# Patient Record
Sex: FEMALE | Race: BLACK OR AFRICAN AMERICAN | Employment: FULL TIME | URBAN - METROPOLITAN AREA
[De-identification: names, ages, dates, MRNs, and addresses within clinical notes are randomized per-mention and may not be internally consistent; named-entity substitution may affect disease eponyms.]

---
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
## 2021-07-10 ENCOUNTER — HOSPITAL ENCOUNTER (EMERGENCY)
Age: 21
Discharge: HOME | End: 2021-07-10
Attending: EMERGENCY MEDICINE | Admitting: EMERGENCY MEDICINE

## 2021-07-10 VITALS
DIASTOLIC BLOOD PRESSURE: 80 MMHG | WEIGHT: 140 LBS | HEART RATE: 68 BPM | TEMPERATURE: 98.4 F | RESPIRATION RATE: 16 BRPM | OXYGEN SATURATION: 100 % | SYSTOLIC BLOOD PRESSURE: 121 MMHG | BODY MASS INDEX: 24.8 KG/M2 | HEIGHT: 63 IN

## 2021-07-10 DIAGNOSIS — R10.9 ABDOMINAL PAIN, UNSPECIFIED ABDOMINAL LOCATION: Primary | ICD-10-CM

## 2021-07-10 LAB
AMORPH CRY URNS QL MICRO: ABNORMAL
APPEARANCE UR: CLEAR
BACTERIA URNS QL MICRO: ABNORMAL /HPF
BILIRUB UR QL: NEGATIVE
COLOR UR: YELLOW
EPITH CASTS URNS QL MICRO: ABNORMAL /LPF
GLUCOSE UR STRIP.AUTO-MCNC: NEGATIVE MG/DL
HCG UR QL: NEGATIVE
HGB UR QL STRIP: NEGATIVE
KETONES UR QL STRIP.AUTO: 50 MG/DL
LEUKOCYTE ESTERASE UR QL STRIP.AUTO: NEGATIVE
NITRITE UR QL STRIP.AUTO: NEGATIVE
PH UR STRIP: 7 [PH] (ref 5–8)
PROT UR STRIP-MCNC: NEGATIVE MG/DL
RBC #/AREA URNS HPF: ABNORMAL /HPF (ref 0–5)
SP GR UR REFRACTOMETRY: 1.01 (ref 1–1.03)
UA: UC IF INDICATED,UAUC: ABNORMAL
UROBILINOGEN UR QL STRIP.AUTO: 0.1 EU/DL (ref 0.2–1)
WBC URNS QL MICRO: ABNORMAL /HPF (ref 0–4)

## 2021-07-10 PROCEDURE — 81025 URINE PREGNANCY TEST: CPT

## 2021-07-10 PROCEDURE — 81001 URINALYSIS AUTO W/SCOPE: CPT

## 2021-07-10 PROCEDURE — 99283 EMERGENCY DEPT VISIT LOW MDM: CPT

## 2021-07-10 NOTE — ED TRIAGE NOTES
Pt c/o 5 days of lower abd pain with some vaginal spotting. Denies dysuria. Missed last menstrual cycle. Home preg test was neg.   Has implant for birth control in arm

## 2021-07-10 NOTE — ED PROVIDER NOTES
EMERGENCY DEPARTMENT HISTORY AND PHYSICAL EXAM      Date: 7/10/2021  Patient Name: Dario Zapata    History of Presenting Illness     Chief Complaint   Patient presents with    Abdominal Pain       History Provided By: Patient    HPI: Dario Zapata, 21 y.o. female with a past medical history significant No significant past medical history presents to the ED with cc of lower abdominal crampy pain and bilateral lower back pain intermittently for the past 5 days. She reports 2 days of spotting last week. Patient states that she has the birth control implant causes her periods to be irregular. However, usually when she has her menstrual period, it lasts anywhere between 1 and 3 weeks. Patient denies vaginal discharge. She denies dysuria or other urinary symptoms. The patient states that she took a home pregnancy test at home and it was negative. The patient reports a monogamous sexual relationship with her boyfriend of 3 years. The patient denies fevers or chills. No nausea or vomiting. There are no other complaints, changes, or physical findings at this time. PCP: None    No current facility-administered medications on file prior to encounter. No current outpatient medications on file prior to encounter. Past History     Past Medical History:  History reviewed. No pertinent past medical history. Past Surgical History:  History reviewed. No pertinent surgical history. Family History:  History reviewed. No pertinent family history. Social History:  Social History     Tobacco Use    Smoking status: Never Smoker    Smokeless tobacco: Never Used   Substance Use Topics    Alcohol use: Never    Drug use: Yes     Types: Marijuana       Allergies:  No Known Allergies      Review of Systems     Review of Systems   Constitutional: Negative for chills and fever. HENT: Negative for congestion and sore throat. Eyes: Negative for pain and redness.    Respiratory: Negative for cough and shortness of breath. Cardiovascular: Negative for chest pain and leg swelling. Gastrointestinal: Positive for abdominal pain. Negative for diarrhea, nausea and vomiting. Genitourinary: Negative for dysuria, frequency, hematuria and urgency. Musculoskeletal: Negative for arthralgias and myalgias. Skin: Negative for pallor and rash. Neurological: Negative for dizziness, numbness and headaches. Psychiatric/Behavioral: Negative for agitation and dysphoric mood. Physical Exam     Physical Exam  Vitals and nursing note reviewed. Constitutional:       General: She is not in acute distress. Appearance: Normal appearance. She is well-developed and normal weight. She is not ill-appearing or toxic-appearing. HENT:      Head: Normocephalic and atraumatic. Mouth/Throat:      Mouth: Mucous membranes are moist.      Pharynx: Oropharynx is clear. No pharyngeal swelling. Eyes:      General: No scleral icterus. Extraocular Movements: Extraocular movements intact. Conjunctiva/sclera: Conjunctivae normal.      Pupils: Pupils are equal, round, and reactive to light. Cardiovascular:      Rate and Rhythm: Normal rate and regular rhythm. Heart sounds: Normal heart sounds. No murmur heard. No friction rub. No gallop. Pulmonary:      Effort: Pulmonary effort is normal. No respiratory distress. Breath sounds: Normal breath sounds. No wheezing, rhonchi or rales. Abdominal:      General: Abdomen is flat. Bowel sounds are normal. There is no distension. Palpations: Abdomen is soft. There is no hepatomegaly, splenomegaly, mass or pulsatile mass. Tenderness: There is no abdominal tenderness. There is no right CVA tenderness, left CVA tenderness, guarding or rebound. Musculoskeletal:         General: No swelling or tenderness. Normal range of motion. Cervical back: Neck supple. Skin:     General: Skin is warm and dry.       Capillary Refill: Capillary refill takes less than 2 seconds. Findings: No erythema or rash. Neurological:      General: No focal deficit present. Mental Status: She is alert and oriented to person, place, and time. Cranial Nerves: No cranial nerve deficit. Motor: No weakness. Psychiatric:         Mood and Affect: Mood normal.         Behavior: Behavior normal.         Diagnostic Study Results     Labs -     Recent Results (from the past 12 hour(s))   URINALYSIS W/ REFLEX CULTURE    Collection Time: 07/10/21 12:11 PM    Specimen: Urine   Result Value Ref Range    Color Yellow      Appearance Clear Clear      Specific gravity 1.015 1.003 - 1.030      pH (UA) 7.0 5.0 - 8.0      Protein Negative Negative mg/dL    Glucose Negative Negative mg/dL    Ketone 50 (A) Negative mg/dL    Bilirubin Negative Negative      Blood Negative Negative      Urobilinogen 0.1 (L) 0.2 - 1.0 EU/dL    Nitrites Negative Negative      Leukocyte Esterase Negative Negative      WBC 0-4 0 - 4 /hpf    RBC 5-10 0 - 5 /hpf    Epithelial cells Moderate (A) Few /lpf    Bacteria 1+ (A) Negative /hpf    UA:UC IF INDICATED Culture not indicated by UA result Culture not indicated by UA result      Amorphous Crystals 2+ (A) Negative   HCG URINE, QL    Collection Time: 07/10/21 12:12 PM   Result Value Ref Range    HCG urine, QL Negative Negative         Radiologic Studies -   @lastxrresult@  CT Results  (Last 48 hours)    None        CXR Results  (Last 48 hours)    None            Medical Decision Making   I am the first provider for this patient. I reviewed the vital signs, available nursing notes, past medical history, past surgical history, family history and social history. Vital Signs-Reviewed the patient's vital signs.   Patient Vitals for the past 12 hrs:   Temp Pulse Resp BP SpO2   07/10/21 1301 -- -- -- -- 100 %   07/10/21 1200 98.4 °F (36.9 °C) 68 16 121/80 100 %       Records Reviewed: Nursing Notes and Old Medical Records        Provider Notes (Medical Decision Making): The patient had a urinalysis which was not consistent with a urinary tract infection. Pregnancy test was negative. The patient reports having a monogamous sexual relationship with her boyfriend for the past 3 years. She denies vaginal discharge. No dysuria or other urinary symptoms. The patient had a nontender abdomen on exam.  I think most likely the patient is going to start her menstrual cycle soon which is causing the pain. The patient has an appointment with her OB/GYN in a couple of weeks. The patient is given follow-up and return to ED instructions, voices understanding, and is agreeable to the plan. [facility]         ED Course:   Initial assessment performed. The patients presenting problems have been discussed, and they are in agreement with the care plan formulated and outlined with them. I have encouraged them to ask questions as they arise throughout their visit. PROCEDURES  Procedures         PLAN:  1. There are no discharge medications for this patient. 2.   Follow-up Information     Follow up With Specialties Details Why Contact Info    your OB/Gyn   as scheduled         Return to ED if worse     Diagnosis     Clinical Impression:   1.  Abdominal pain, unspecified abdominal location

## 2022-09-22 ENCOUNTER — HOSPITAL ENCOUNTER (EMERGENCY)
Age: 22
Discharge: HOME | End: 2022-09-22
Payer: COMMERCIAL

## 2022-09-22 VITALS
OXYGEN SATURATION: 100 % | WEIGHT: 140 LBS | BODY MASS INDEX: 25.76 KG/M2 | DIASTOLIC BLOOD PRESSURE: 80 MMHG | TEMPERATURE: 98.7 F | SYSTOLIC BLOOD PRESSURE: 150 MMHG | RESPIRATION RATE: 20 BRPM | HEART RATE: 95 BPM | HEIGHT: 62 IN

## 2022-09-22 DIAGNOSIS — S91.011A LACERATION OF RIGHT ANKLE, INITIAL ENCOUNTER: Primary | ICD-10-CM

## 2022-09-22 PROCEDURE — 99283 EMERGENCY DEPT VISIT LOW MDM: CPT

## 2022-09-22 PROCEDURE — 75810000293 HC SIMP/SUPERF WND  RPR

## 2022-09-22 RX ORDER — BACITRACIN 500 [USP'U]/G
OINTMENT TOPICAL 3 TIMES DAILY
Qty: 1 EACH | Refills: 0 | Status: SHIPPED | OUTPATIENT
Start: 2022-09-22 | End: 2022-10-02

## 2022-09-22 RX ORDER — LIDOCAINE HYDROCHLORIDE 20 MG/ML
0.3 INJECTION, SOLUTION INFILTRATION; PERINEURAL ONCE
Status: DISCONTINUED | OUTPATIENT
Start: 2022-09-22 | End: 2022-09-22 | Stop reason: HOSPADM

## 2022-09-22 NOTE — ED PROVIDER NOTES
EMERGENCY DEPARTMENT HISTORY AND PHYSICAL EXAM      Date: 9/22/2022  Patient Name: Josephine Ac    History of Presenting Illness     Chief Complaint   Patient presents with    Laceration       History Provided By: Patient    HPI: Josephine Ac, 24 y.o. female [facility]'s of laceration to right heel. She reports working at [employer] when she was taking out the trash which hit the back of her heel thinking a glass bottle cut her. She reports bleeding controlled with use of pressure dressing. Up-to-date on tetanus shot. There are no other complaints, changes, or physical findings at this time. PCP: None    No current facility-administered medications on file prior to encounter. No current outpatient medications on file prior to encounter. Past History     Past Medical History:  History reviewed. No pertinent past medical history. Past Surgical History:  History reviewed. No pertinent surgical history. Family History:  History reviewed. No pertinent family history. Social History:  Social History     Tobacco Use    Smoking status: Never    Smokeless tobacco: Never   Substance Use Topics    Alcohol use: Never    Drug use: Not Currently     Types: Marijuana       Allergies:  No Known Allergies    Review of Systems   Review of Systems   Constitutional:  Negative for chills and fever. Musculoskeletal:  Negative for arthralgias and myalgias. Skin:  Positive for wound. Psychiatric/Behavioral: Negative. All other systems reviewed and are negative. Physical Exam   Physical Exam  Vitals and nursing note reviewed. Constitutional:       General: She is not in acute distress. Appearance: Normal appearance. She is normal weight. She is not ill-appearing. HENT:      Head: Normocephalic and atraumatic. Nose: Nose normal.      Mouth/Throat:      Mouth: Mucous membranes are moist.   Eyes:      Extraocular Movements: Extraocular movements intact.       Conjunctiva/sclera: Conjunctivae normal.   Cardiovascular:      Pulses: Normal pulses. Dorsalis pedis pulses are 2+ on the right side and 2+ on the left side. Pulmonary:      Effort: Pulmonary effort is normal. No respiratory distress. Musculoskeletal:         General: Signs of injury present. Normal range of motion. Right foot: Normal range of motion. Left foot: Normal range of motion. Skin:     General: Skin is warm and dry. Capillary Refill: Capillary refill takes less than 2 seconds. Findings: Signs of injury and laceration present. No abrasion, ecchymosis or rash. Comments: Right ankle about 2.5 cm   Neurological:      Mental Status: She is alert. Lab and Diagnostic Study Results   Labs -   No results found for this or any previous visit (from the past 12 hour(s)). Radiologic Studies -   @lastxrresult@  CT Results  (Last 48 hours)      None          CXR Results  (Last 48 hours)      None            Medical Decision Making and ED Course   Differential Diagnosis & Medical Decision Making Provider Note: Laceration, abrasion    Patient afebrile not tachycardic SPO2 100% on room air. Hemodynamically stable. Site approximated with 4 sutures to right heel tolerated well advised signs symptoms of infection follow-up in 7 to 10 days for suture removal.  Patient verbalized understanding stable at time of discharge. - I am the first provider for this patient. I reviewed the vital signs, available nursing notes, past medical history, past surgical history, family history and social history. The patients presenting problems have been discussed, and they are in agreement with the care plan formulated and outlined with them. I have encouraged them to ask questions as they arise throughout their visit. Vital Signs-Reviewed the patient's vital signs.   Patient Vitals for the past 12 hrs:   Temp Pulse Resp BP SpO2   09/22/22 1333 98.7 °F (37.1 °C) 95 20 (!) 150/80 100 %       ED Course: Procedures   Performed by: Renea Cummins NP  Wound Repair    Date/Time: 9/22/2022 2:39 PM  Performed by: NPPreparation: skin prepped with Shur-Clens  Pre-procedure re-eval: Immediately prior to the procedure, the patient was reevaluated and found suitable for the planned procedure and any planned medications. Time out: Immediately prior to the procedure a time out was called to verify the correct patient, procedure, equipment, staff and marking as appropriate. .  Location details: right ankle  Wound length:2.5 cm or less    Anesthesia:  Local Anesthetic: lidocaine 2% without epinephrine  Foreign bodies: no foreign bodies  Irrigation solution: saline  Debridement: none  Wound skin closure material used: 3.0. Number of sutures: 4  Technique: simple and interrupted  Approximation: close  Patient tolerance: patient tolerated the procedure well with no immediate complications  My total time at bedside, performing this procedure was 1-15 minutes. Disposition   Disposition: DC- Adult Discharges: All of the diagnostic tests were reviewed and questions answered. Diagnosis, care plan and treatment options were discussed. The patient understands the instructions and will follow up as directed. The patients results have been reviewed with them. They have been counseled regarding their diagnosis. The patient verbally convey understanding and agreement of the signs, symptoms, diagnosis, treatment and prognosis and additionally agrees to follow up as recommended with their PCP in 24 - 48 hours. They also agree with the care-plan and convey that all of their questions have been answered. I have also put together some discharge instructions for them that include: 1) educational information regarding their diagnosis, 2) how to care for their diagnosis at home, as well a 3) list of reasons why they would want to return to the ED prior to their follow-up appointment, should their condition change.     DISCHARGE PLAN:  1. Current Discharge Medication List        START taking these medications    Details   bacitracin (BACITRACIN) 500 unit/gram oint Apply  to affected area three (3) times daily for 10 days. Apply to affected area  Qty: 1 Each, Refills: 0           2. Follow-up Information       Follow up With Specialties Details Why Contact Info    Lora Minor NP Nurse Practitioner Schedule an appointment as soon as possible for a visit in 1 week establish care with pcp, For suture removal 7-10 days [facility]  [phone]            3. Return to ED if worse   4. Current Discharge Medication List        START taking these medications    Details   bacitracin (BACITRACIN) 500 unit/gram oint Apply  to affected area three (3) times daily for 10 days. Apply to affected area  Qty: 1 Each, Refills: 0  Start date: 9/22/2022, End date: 10/2/2022             Diagnosis/Clinical Impression     Clinical Impression:   1. Laceration of right ankle, initial encounter        Attestations: Renea WALLS NP, am the primary clinician of record. Please note that this dictation was completed with [employer], the [employer] voice recognition software. Quite often unanticipated grammatical, syntax, homophones, and other interpretive errors are inadvertently transcribed by the computer software. Please disregard these errors. Please excuse any errors that have escaped final proofreading. Thank you.

## 2022-09-22 NOTE — Clinical Note
[address]  [address] [place] [id number]  [phone]    Work/School Note    Date: 9/22/2022    To Whom It May concern:      Stephon Booker was seen and treated today in the emergency room by the following provider(s):  Nurse Practitioner: Etta Ledesma NP. Stephon Booker is excused from work/school on 09/22/22. She is clear to return to work/school on 09/23/22.         Sincerely,          Janina Franz NP

## 2022-09-22 NOTE — ED TRIAGE NOTES
Pt was taking out the trash at her job and something hit the back of her leg and she didn't think much of it until she noticed the blood from the lac. Possible beer bottle in the trash. Tetanus is up to date.

## 2022-09-23 ENCOUNTER — PATIENT OUTREACH (OUTPATIENT)
Dept: OTHER | Age: 22
End: 2022-09-23

## 2022-09-23 NOTE — PROGRESS NOTES
Initial HPRP:   Patient on report as discharged from [facility] ED Visit 9/22/22 for Laceration of Right Ankle. Initial attempt to contact patient for transitions of care. Left discreet message on voicemail with this Care Coordinator's contact information. Next Outreach 9/26/22. f/u - ? PCP      bacitracin 500 unit/gram Oint    Call your doctor now or seek immediate medical care if:  You have new pain, or your pain gets worse. The skin near the cut is cold or pale or changes color. You have tingling, weakness, or numbness near the cut. The cut starts to bleed, and blood soaks through the bandage. Oozing small amounts of  blood is normal.  You have trouble moving the area near the cut. You have symptoms of infection, such as: Increased pain, swelling, warmth, or redness around the cut. Red streaks leading from the cut. Pus draining from the cut. A fever. The cut reopens.

## 2022-09-26 ENCOUNTER — PATIENT OUTREACH (OUTPATIENT)
Dept: OTHER | Age: 22
End: 2022-09-26

## 2022-09-26 NOTE — LETTER
9/26/2022 4:45 PM    Ms. Oni Ramirez 89    Dear Javi Alexis      My name is Michelle Ca, Associate Care Coordinator for [address] and I have been trying to reach you. The Associate Care Management (ACM) program is a free-of-charge confidential service provided to our associates and their family members covered by the [facility]. The program will provide an associate and his/her family with the [facility] expertise to assist in navigating the health care delivery system, provider services, and their overall care needs--so as to assure and improve health care interactions and enhance the quality of life. This program is designed to provide you with the opportunity to have a [employer] partner with you for the following services:     1) when you come home from the hospital or emergency room   2) when help is needed to manage your disease   3) when you need assistance coordinating services or appointments  4) when you need additional education, resources or assistance reaching your Be Well Health Program goals/requirements such as Be Well With Diabetes    [facility] is dedicated to empowering the good health of its community and improving the quality of care and care experiences for associates and their families. We are committed to safeguarding patient confidentiality and privacy, assuring that every associate has the respect he or she deserves in managing their health. The information shared with your care manager will not be shared with anyone else aside from those you identify as part of your care team, and will only be used to assist you with any identified care needs. Please contact me if you would like this service provided to you.      Sincerely,    Joan Giles, [address] Coordinator  Associate Care Management  [facility]  [address] [address] [phone]  F [phone]  [email or web address]

## 2022-09-26 NOTE — PROGRESS NOTES
Patient identified as eligible for [address] [place] [address] services. Second telephone outreach attempted. Left discreet voicemail with this CM confidential contact information. Will send UTR letter via Mail. Next Outreach 10/12/22 f/u - Suture Removal, ? PCP

## 2022-10-12 ENCOUNTER — PATIENT OUTREACH (OUTPATIENT)
Dept: OTHER | Age: 22
End: 2022-10-12

## 2022-10-12 NOTE — PROGRESS NOTES
HPRP f/u:  Telephone attempt to contact patient for Health Promotion and Risk Prevention. Left discreet message on voicemail with this CC contact information. Will follow for one month for transitions of care needs. Next outreach is 10/24/22 for discussion f/u - Suture Removal, ? PCP and Resolve Episode.

## 2022-10-16 ENCOUNTER — HOSPITAL ENCOUNTER (EMERGENCY)
Age: 22
Discharge: HOME | End: 2022-10-16
Attending: FAMILY MEDICINE
Payer: COMMERCIAL

## 2022-10-16 VITALS
SYSTOLIC BLOOD PRESSURE: 120 MMHG | HEART RATE: 79 BPM | OXYGEN SATURATION: 99 % | BODY MASS INDEX: 24.8 KG/M2 | WEIGHT: 140 LBS | RESPIRATION RATE: 18 BRPM | TEMPERATURE: 99 F | DIASTOLIC BLOOD PRESSURE: 80 MMHG | HEIGHT: 63 IN

## 2022-10-16 DIAGNOSIS — Z51.89 VISIT FOR WOUND CHECK: Primary | ICD-10-CM

## 2022-10-16 PROCEDURE — 99282 EMERGENCY DEPT VISIT SF MDM: CPT

## 2022-10-16 NOTE — Clinical Note
[address]  [address] [facility] [address]  [phone]    Work/School Note    Date: 10/16/2022    To Whom It May concern:      Sara Hutsonuphin was seen and treated today in the emergency room by the following provider(s):  Attending Provider: Lane Regalado is excused from work/school on 10/16/22. She is clear to return to work/school on 10/17/22.         Sincerely,          Valentino Santos, DO

## 2022-10-16 NOTE — ED PROVIDER NOTES
EMERGENCY DEPARTMENT HISTORY AND PHYSICAL EXAM      Date: 10/16/2022  Patient Name: Bonita Cohen    History of Presenting Illness     Chief Complaint   Patient presents with    Skin Problem       History Provided By:     HPI: Bonita Cohen, is a very pleasant 24 y.o. female presenting to the ED with a chief complaint of skin problem. Patient states she recently had a laceration above her right heel. Stitches were placed. These have since been removed. Since this time when she walks she experiences a pulling sensation. She denies any redness nor drainage of pus. No numbness tingling or weakness in extremities. No other complaints at this time. The patient denies any other symptoms at this time. PCP: None    No current facility-administered medications on file prior to encounter. No current outpatient medications on file prior to encounter. Past History     Past Medical History:  History reviewed. No pertinent past medical history. Past Surgical History:  No past surgical history on file. Family History:  History reviewed. No pertinent family history. Social History:  Social History     Tobacco Use    Smoking status: Never    Smokeless tobacco: Never   Substance Use Topics    Alcohol use: Never    Drug use: Not Currently     Types: Marijuana       Allergies:  No Known Allergies      Review of Systems     Review of Systems   Constitutional:  Negative for activity change, appetite change, chills, fatigue and fever. HENT:  Negative for congestion and sore throat. Eyes:  Negative for photophobia and visual disturbance. Respiratory:  Negative for cough, shortness of breath and wheezing. Cardiovascular:  Negative for chest pain, palpitations and leg swelling. Gastrointestinal:  Negative for abdominal pain, diarrhea, nausea and vomiting. Endocrine: Negative for cold intolerance and heat intolerance. Musculoskeletal:  Negative for gait problem and joint swelling.    Skin: Negative for color change and rash. Neurological:  Negative for dizziness and headaches. Physical Exam     Physical Exam  Constitutional:       Appearance: She is well-developed. HENT:      Head: Normocephalic and atraumatic. Mouth/Throat:      Mouth: Mucous membranes are moist.      Pharynx: Oropharynx is clear. Eyes:      Conjunctiva/sclera: Conjunctivae normal.      Pupils: Pupils are equal, round, and reactive to light. Cardiovascular:      Rate and Rhythm: Normal rate and regular rhythm. Heart sounds: No murmur heard. Pulmonary:      Effort: No respiratory distress. Breath sounds: No stridor. No wheezing, rhonchi or rales. Abdominal:      General: There is no distension. Tenderness: There is no abdominal tenderness. There is no rebound. Musculoskeletal:      Cervical back: Normal range of motion and neck supple. Skin:     General: Skin is warm and dry. Comments: Healing 1 cm laceration above right heel. No cellulitis lymphangitis or fluctuance. Neurological:      General: No focal deficit present. Mental Status: She is alert and oriented to person, place, and time. Psychiatric:         Mood and Affect: Mood normal.         Behavior: Behavior normal.       Lab and Diagnostic Study Results     Labs -   No results found for this or any previous visit (from the past 12 hour(s)). Radiologic Studies -   @lastxrresult@  CT Results  (Last 48 hours)      None          CXR Results  (Last 48 hours)      None              Medical Decision Making   - I am the first provider for this patient. - I reviewed the vital signs, available nursing notes, past medical history, past surgical history, family history and social history. - Initial assessment performed. The patients presenting problems have been discussed, and they are in agreement with the care plan formulated and outlined with them.   I have encouraged them to ask questions as they arise throughout their visit.    Vital Signs-Reviewed the patient's vital signs. Patient Vitals for the past 12 hrs:   Temp Pulse Resp BP SpO2   10/16/22 0930 99 °F (37.2 °C) 79 18 120/80 99 %         ED Course/ Provider Notes (Medical Decision Making):     Patient presented to the emergency department with the aforementioned chief complaint. On examination the patient is nontoxic. Vitals were reviewed per above. Wound appears to be healing very well. No dehiscence. Patient neurovascularly intact. Will have patient follow-up with podiatry regarding continued pain    Manoj Santos was given a thorough list of signs and symptoms that would warrant an immediate return to the emergency department. Otherwise Manoj Santos will follow up with PCP. Procedures   Medical Decision Makingedical Decision Making  Performed by: Christine Ulloa,   Procedures  None       Disposition   Disposition:     Home     All of the diagnostic tests were reviewed and questions answered. Diagnosis, care plan and treatment options were discussed. The patient understands the instructions and will follow up as directed. The patients results have been reviewed with them. They have been counseled regarding their diagnosis. The patient verbally convey understanding and agreement of the signs, symptoms, diagnosis, treatment and prognosis and additionally agrees to follow up as recommended with their PCP in 24 - 48 hours. They also agree with the care-plan and convey that all of their questions have been answered. I have also put together some discharge instructions for them that include: 1) educational information regarding their diagnosis, 2) how to care for their diagnosis at home, as well a 3) list of reasons why they would want to return to the ED prior to their follow-up appointment, should their condition change. DISCHARGE PLAN:    1. There are no discharge medications for this patient.         2.   Follow-up Information       Follow up With Specialties Details Why Contact Info    Your primary care doctor  Schedule an appointment as soon as possible for a visit in 2 days      [facility] Call today  [address] [facility] [address]  [phone]                3.  Return to ED if worse       4. There are no discharge medications for this patient. Diagnosis     Clinical Impression:    1. Visit for wound check        Attestations:    Jhony Rodrigez, DO    Please note that this dictation was completed with [employer], the computer voice recognition software. Quite often unanticipated grammatical, syntax, homophones, and other interpretive errors are inadvertently transcribed by the computer software. Please disregard these errors. Please excuse any errors that have escaped final proofreading. Thank you.

## 2022-10-17 ENCOUNTER — PATIENT OUTREACH (OUTPATIENT)
Dept: OTHER | Age: 22
End: 2022-10-17

## 2022-10-17 NOTE — PROGRESS NOTES
[facility]PRP f/u:  Telephone attempt to contact patient for Health Promotion and Risk Prevention. Left discreet message on voicemail with this CC contact information. Chart Review; 10/16/22 [facility] ED - Visit for Wound Check    Salvador Rock, is a very pleasant 24 y.o. female presenting to the ED with a chief complaint of skin problem. Patient states she recently had a laceration above her right heel. Stitches were placed. These have since been removed. Since this time when she walks she experiences a pulling sensation. She denies any redness nor drainage of pus. No numbness tingling or weakness in extremities. Wound appears to be healing very well. No dehiscence. Patient neurovascularly intact. Will have patient follow-up with podiatry regarding continued pain    You have not been prescribed any medications.

## 2022-10-24 ENCOUNTER — PATIENT OUTREACH (OUTPATIENT)
Dept: OTHER | Age: 22
End: 2022-10-24

## 2022-10-24 NOTE — LETTER
10/24/2022 4:40 PM    Ms. Oni Ramirez 89    Dear Elizabeth Ervin    My name is Taj Mitchell,  Associate Care Coordinator for [address] [place] [address],[address], and I have been trying to reach you. The Associate Care Management (ACM) program is a free-of-charge, confidential service provided to our employees and their family members covered by the [facility]. I can help you with care transitions such as when you come home from the hospital, when help is needed to manage your disease, or when you need assistance coordinating services or appointments. As healthcare providers, we know that patients do better when they have close follow up with a primary care provider (PCP). I can help you find one that is convenient to you and covered by your insurance. I can also help you understand any after visit instructions, such as what symptoms to watch out for, or any new or changed medications. We can work together using your preferred communication -- telephone, email, MyChart. If you do not have a [employer] account, I can help you request access. Our program is designed to provide you with the opportunity to have a [facility] care manager partner with you for your healthcare needs. Due to not being able to reach you, I am closing out the current program, but will remain available to you should you have any questions. Please contact me at the below number if I can provide you with assistance for any of the above services.     Sincerely,    aKthleen Avery LPN  [facility] Care Coordinator  Associate Care Management  [address] [place] [address],[address]  [address]  [address]  [place] [phone]   [phone]  [email or web address]

## 2022-10-26 ENCOUNTER — OFFICE VISIT (OUTPATIENT)
Dept: PODIATRY | Age: 22
End: 2022-10-26
Payer: COMMERCIAL

## 2022-10-26 VITALS
BODY MASS INDEX: 24.98 KG/M2 | DIASTOLIC BLOOD PRESSURE: 72 MMHG | HEIGHT: 63 IN | SYSTOLIC BLOOD PRESSURE: 131 MMHG | WEIGHT: 141 LBS | HEART RATE: 71 BPM | TEMPERATURE: 97.1 F

## 2022-10-26 DIAGNOSIS — M25.471 PAIN AND SWELLING OF RIGHT ANKLE: Primary | ICD-10-CM

## 2022-10-26 DIAGNOSIS — M25.571 PAIN AND SWELLING OF RIGHT ANKLE: Primary | ICD-10-CM

## 2022-10-26 PROCEDURE — 99203 OFFICE O/P NEW LOW 30 MIN: CPT | Performed by: PODIATRIST

## 2022-10-26 PROCEDURE — 73610 X-RAY EXAM OF ANKLE: CPT | Performed by: PODIATRIST

## 2022-10-26 NOTE — PROGRESS NOTES
[facility] FOOT SURGERY     Patient Name: Floresita Velazquez    : 2000    Visit Date: 10/26/2022    Office Visit Note    Subjective:         Patient is a 24 y.o. female who is being in office with pain and swelling of the right heel. Patient states this all started when she had a laceration at her job with a thick glass bottle to the back of her ankle. Patient went to the emergency department 2022 in which the ER doctor performed a primary closure of the right posterior ankle. Patient then went to remove sutures 2 weeks later and patient states that she still complained about some pain to the posterior ankle and strength to her foot. Patient then went to ER again on 10/16/2022 still complaining of pain and she was referred to our office for further evaluation. Patient states that she has radiating pain when she ambulates. No past medical history on file. No past surgical history on file. No family history on file. Social History     Tobacco Use    Smoking status: Never    Smokeless tobacco: Never   Substance Use Topics    Alcohol use: Never     No Known Allergies  Prior to Admission medications    Not on File       Review of Systems   Constitutional:  Negative for chills, diaphoresis, fever and malaise/fatigue. Respiratory:  Negative for cough, hemoptysis, sputum production, shortness of breath and wheezing. No cyanosis   Cardiovascular:  Negative for chest pain, palpitations, claudication and leg swelling. Gastrointestinal:  Negative for abdominal pain, constipation, diarrhea, heartburn, nausea and vomiting. Genitourinary:  Negative for frequency. Musculoskeletal:  Positive for joint pain. Negative for back pain, myalgias and neck pain. Skin:  Negative for itching and rash. Neurological:  Negative for tingling and headaches. Alert and oriented x 4. Endo/Heme/Allergies:  Negative for polydipsia.    Psychiatric/Behavioral:  Negative for depression and memory loss. The patient is not nervous/anxious. Objective:     Visit Vitals  /72 (BP 1 Location: Right upper arm, BP Patient Position: Sitting, BP Cuff Size: Large adult)   Pulse 71   Temp 97.1 °F (36.2 °C) (Temporal)   Ht 5' 3\" (1.6 m)   Wt 141 lb (64 kg)   BMI 24.98 kg/m²       GEN: Pt is AAOx4 and in NAD. No dressing noted to B/L LE. No family noted at [facility]  DERM: Wound to the posterior aspect of the right ankle was noted to be healed with cicatrix noted. LE. No dry skin noted to B/L LE. No proximal lymphatic streaking noted to B/L LE. NCSOI noted to B/L LE  VASC: Pedal pulses (DP/PT) palpable to B/L LE. CFT<3sec to all digits of B/L LE. Pedal hair growth noted to the level of the digits for B/L LE. Skin temp is warm to warm from proximal to distal for B/L LE. Neg homans/gaby signs to B/L LE. No varicosities or telangectasias noted to B/L LE. Edema noted to the right ankle. NEURO: Protective and epicritic sensations grossly intact to B/L LE  MSK: (Pain along the Achilles tendon of the right lower extremity. Decreased muscle strength to the right ankle joint. PSYCH: Cooperative with normal mood and affect                 X-rays of the right ankle 3 views. No acute fractures noted. Joint spacing is adequate. No breaks in cortices or radiolucencies. No foreign body noted to the soft tissue. Data Review: No results found for this or any previous visit (from the past 24 hour(s)). Assessment:     Diagnoses and all orders for this visit:    1. Pain and swelling of right ankle  -     MRI ANKLE RT WO CONT; Future       Plan:     -Patient seen and evaluated in the office. -X-rays of the right ankle taken in office to rule out any foreign body. No foreign body seen in x-ray. -Prescribed MRI to the right ankle of the patient to evaluate Achilles tendon laceration.  -Discussed with patient to wear compression sock to reduce swelling of the right ankle.   -Patient to follow-up in office after MRI results.            Rebel Spencer, DERICK, CWSP, [facility] and [address], [address]: [phone]  F: [phone]

## 2022-10-28 ENCOUNTER — HOSPITAL ENCOUNTER (OUTPATIENT)
Dept: MRI IMAGING | Age: 22
Discharge: HOME | End: 2022-10-28
Attending: PODIATRIST
Payer: COMMERCIAL

## 2022-10-28 DIAGNOSIS — M25.471 PAIN AND SWELLING OF RIGHT ANKLE: ICD-10-CM

## 2022-10-28 DIAGNOSIS — M25.571 PAIN AND SWELLING OF RIGHT ANKLE: ICD-10-CM

## 2022-10-28 PROCEDURE — 73721 MRI JNT OF LWR EXTRE W/O DYE: CPT

## 2022-10-31 ENCOUNTER — TELEPHONE (OUTPATIENT)
Dept: PODIATRY | Age: 22
End: 2022-10-31

## 2022-10-31 NOTE — TELEPHONE ENCOUNTER
PT CAME IN TO SEE IF DR ROQUE WOULD BE ABLE TO READ HER THE MRI RESULTS, NOT NOTATED YET.  PLEASE ADVISE

## 2022-11-01 ENCOUNTER — OFFICE VISIT (OUTPATIENT)
Dept: PODIATRY | Age: 22
End: 2022-11-01
Payer: COMMERCIAL

## 2022-11-01 VITALS
BODY MASS INDEX: 24.8 KG/M2 | HEART RATE: 60 BPM | SYSTOLIC BLOOD PRESSURE: 158 MMHG | WEIGHT: 140 LBS | OXYGEN SATURATION: 100 % | TEMPERATURE: 96.9 F | HEIGHT: 63 IN | DIASTOLIC BLOOD PRESSURE: 76 MMHG

## 2022-11-01 DIAGNOSIS — S86.011D PARTIAL TEAR OF RIGHT ACHILLES TENDON, SUBSEQUENT ENCOUNTER: Primary | ICD-10-CM

## 2022-11-01 PROCEDURE — 99213 OFFICE O/P EST LOW 20 MIN: CPT | Performed by: PODIATRIST

## 2022-11-01 NOTE — LETTER
NOTIFICATION RETURN TO WORK / SCHOOL    11/1/2022 11:11 AM    Ms. Houston Brenner  [facility] [address] [address]      To Whom It May Concern:    Houston Brenner is currently under the care of Alma Gonzalez. She can return to work/school with the assistance of the walking boot. If there are questions or concerns please have the patient contact our office.         Sincerely,      Go Najera DPM

## 2022-11-01 NOTE — PROGRESS NOTES
[facility] FOOT SURGERY     Patient Name: Willie Grewal    : 2000    Visit Date: 2022    Office Visit Note    Subjective:         Patient is a 24 y.o. female who is being in office follow up pain right and swelling to right ankle. Patient presents to discuss MRI results. History reviewed. No pertinent past medical history. History reviewed. No pertinent surgical history. History reviewed. No pertinent family history. Social History     Tobacco Use    Smoking status: Never    Smokeless tobacco: Never   Substance Use Topics    Alcohol use: Never     No Known Allergies  Prior to Admission medications    Not on File       ROS     Objective: There were no vitals taken for this visit. GEN: Pt is AAOx4 and in NAD. No dressing noted to B/L LE. No family noted at [facility]  DERM: No wounds noted to B/L LE. No dry skin noted to B/L LE. No proximal lymphatic streaking noted to B/L LE. NCSOI noted to B/L LE  VASC: Pedal pulses (DP/PT) palpable to B/L LE. CFT<3sec to all digits of B/L LE. Pedal hair growth noted to the level of the digits for B/L LE. Skin temp is warm to warm from proximal to distal for B/L LE. Neg homans/gaby signs to B/L LE. No varicosities or telangectasias noted to B/L LE.  NEURO: Protective and epicritic sensations grossly intact to B/L LE  MSK: Pain on palpation along the right achilles tendon. Good muscle tone and bulk noted to B/L LE.  PSYCH: Cooperative with normal mood and affect     Data Review: No results found for this or any previous visit (from the past 24 hour(s)). Assessment:   Diagnoses and all orders for this visit:    1. Partial tear of right Achilles tendon, subsequent encounter     Plan:     Patient seen and evaluated in the office. MRI results seen and discussed with patient. Discussed conservative and surgical treatment for her condition.   At this time patient opted for conservative treatments will be taken patient to remain 6 weeks in cam walker with heel lifts. Dispense cam walker with heel lifts. Patient to remove half of heel lifts in 3 weeks. Follow-up and Dispositions    Return in about 6 weeks (around 12/13/2022).                Elba Gaona DPM, CW, [address] [place] [address], [address] [place] [address]: [phone]  F: [phone]

## 2022-11-01 NOTE — PROGRESS NOTES
1. \"Have you been to the ER, urgent care clinic since your last visit? Hospitalized since your last visit? \" No    2. \"Have you seen or consulted any other health care providers outside of the [address] [place] [address] since your last visit? \" No     3. For patients aged 39-70: Has the patient had a colonoscopy / FIT/ Cologuard? NA - based on age      If the patient is female:    4. For patients aged 41-77: Has the patient had a mammogram within the past 2 years? NA - based on age or sex      11. For patients aged 21-65: Has the patient had a pap smear?  Yes - no Care Gap present    Chief Complaint   Patient presents with    Results     Mri results

## 2022-11-03 ENCOUNTER — TELEPHONE (OUTPATIENT)
Dept: PODIATRY | Age: 22
End: 2022-11-03

## 2022-11-03 NOTE — TELEPHONE ENCOUNTER
PT CALLED AND STATED THAT SHE NEEDS A CLOSED TOE BOOT AND NOTE  IN ORDER TO BE ABLE TO PARTICIPATE CLINICALS AND BE IN MEDICAL FACILITY    CLINICAL OPTIONS 1 DAY 6 HRS OR 2 DAYS 3 HRS

## 2022-12-13 ENCOUNTER — OFFICE VISIT (OUTPATIENT)
Dept: PODIATRY | Age: 22
End: 2022-12-13
Payer: COMMERCIAL

## 2022-12-13 VITALS
SYSTOLIC BLOOD PRESSURE: 124 MMHG | BODY MASS INDEX: 25.09 KG/M2 | DIASTOLIC BLOOD PRESSURE: 78 MMHG | WEIGHT: 141.6 LBS | TEMPERATURE: 96.9 F | HEART RATE: 67 BPM | HEIGHT: 63 IN | OXYGEN SATURATION: 99 %

## 2022-12-13 DIAGNOSIS — S86.011D PARTIAL TEAR OF RIGHT ACHILLES TENDON, SUBSEQUENT ENCOUNTER: Primary | ICD-10-CM

## 2022-12-13 PROCEDURE — 99213 OFFICE O/P EST LOW 20 MIN: CPT | Performed by: PODIATRIST

## 2022-12-13 NOTE — PROGRESS NOTES
[address] [facility] FOOT SURGERY     Patient Name: Kerri Apgar    : 2000    Visit Date: 2022    Office Visit Note    Subjective:     Patient is a 25 y.o. female who is being seen in office follow-up visit for partial tear of her right Achilles tendon. Patient has been wearing boot for 6 weeks. Patient that she has pain to her anterior ankle when ambulating without boot. Patient states that she has been with the pain most of her time. History reviewed. No pertinent past medical history. History reviewed. No pertinent surgical history. History reviewed. No pertinent family history. Social History     Tobacco Use    Smoking status: Never    Smokeless tobacco: Never   Substance Use Topics    Alcohol use: Never     No Known Allergies  Prior to Admission medications    Not on File       Review of Systems   Constitutional:  Negative for chills, diaphoresis, fever and malaise/fatigue. Respiratory:  Negative for cough, hemoptysis, sputum production, shortness of breath and wheezing. No cyanosis   Cardiovascular:  Positive for leg swelling. Negative for chest pain, palpitations and claudication. Gastrointestinal:  Negative for abdominal pain, constipation, diarrhea, heartburn, nausea and vomiting. Genitourinary:  Negative for frequency. Musculoskeletal:  Positive for joint pain. Negative for back pain, myalgias and neck pain. Skin:  Negative for itching and rash. Neurological:  Negative for tingling and headaches. Alert and oriented x 4. Endo/Heme/Allergies:  Negative for polydipsia. Psychiatric/Behavioral:  Negative for depression and memory loss. The patient is not nervous/anxious. Objective: There were no vitals taken for this visit. GEN: Pt is AAOx4 and in NAD. No dressing noted to B/L LE. No family noted at [facility]  DERM: No wounds noted to B/L LE. No dry skin noted to B/L LE.  No proximal lymphatic streaking noted to B/L Jan Catalan noted to B/L LE  VASC: Pedal pulses (DP/PT) palpable to B/L LE. CFT<3sec to all digits of B/L LE. Pedal hair growth noted to the level of the digits for B/L LE. Skin temp is warm to warm from proximal to distal for B/L LE. Neg homans/gaby signs to B/L LE. No varicosities or telangectasias noted to B/L LE.  NEURO: Protective and epicritic sensations grossly intact to B/L LE  MSK: Decreased muscle strength to the left lower extremity. Achilles tendon is palpable and noted to be intact. No palpable dell. Adequate plantar flexion dorsiflexion. No pain on palpation recommend patient transition out of the left today from an open the garage  PSYCH: Cooperative with normal mood and affect     Data Review: No results found for this or any previous visit (from the past 24 hour(s)). Assessment:     Diagnoses and all orders for this visit:    1. Partial tear of right Achilles tendon, subsequent encounter  -     REFERRAL TO PHYSICAL THERAPY         Plan:     Patient seen and evaluated in the office. Patient to transition out of cam boot for 2 weeks. Patient to return to normal shoe gear. Recommend patient physical therapy evaluation and treatment. Patient to wear compression socks to control right lower extremity edema. Follow-up and Dispositions    Return in about 4 weeks (around 1/10/2023).             Kamryn Dumont DPM, CW, [address] [place], [place] [address], [address] [place] [address]: [phone]  F: [phone]

## 2022-12-13 NOTE — PROGRESS NOTES
1. Have you been to the ER, urgent care clinic since your last visit? Hospitalized since your last visit? No    2. Have you seen or consulted any other health care providers outside of the [address] [place] [address] since your last visit? Include any pap smears or colon screening.  No    Chief Complaint   Patient presents with    Follow-up     6 wk follow up boot check

## 2023-02-02 ENCOUNTER — HOSPITAL ENCOUNTER (OUTPATIENT)
Dept: PHYSICAL THERAPY | Age: 23
Discharge: HOME | End: 2023-02-02
Payer: COMMERCIAL

## 2023-02-02 PROCEDURE — 97140 MANUAL THERAPY 1/> REGIONS: CPT | Performed by: PHYSICAL THERAPIST

## 2023-02-02 PROCEDURE — 97161 PT EVAL LOW COMPLEX 20 MIN: CPT | Performed by: PHYSICAL THERAPIST

## 2023-02-02 PROCEDURE — 97110 THERAPEUTIC EXERCISES: CPT | Performed by: PHYSICAL THERAPIST

## 2023-02-02 NOTE — THERAPY EVALUATION
[address] [address] [facility] [address] [facility], [address] [place] [address]  Ph: [phone]    Fax: [phone]    Initial Evaluation/Plan of Care/Statement of Necessity for Physical Therapy Services     Patient name: Adrian Coronel    Date/Start of Care:2023  : 2000  [x]  Patient  Verified  Provider#: [id number]          Referral source: Nathalie Cadet DPM    Return visit to MD: --     Medical/Treatment Diagnosis: Right ankle pain [M25.571]  Strain of right Achilles tendon, subsequent encounter [S86.011D]    Payor: BLUE CROSS / Plan: [address] / Product Type: PPO /       Prior Hospitalization: see medical history     Comorbidities: HTN  Prior Level of Function: ind with ambulation, attend gym occasionally   Medications: Verified on Patient Summary List          Patient / Family readiness to learn indicated by: asking questions, trying to perform skills, and interest  Persons(s) to be included in education: patient (P)  Barriers to Learning/Limitations: None  Patient Self Reported Health Status: good  Rehabilitation Potential: good  Previous Treatment/Compliance: walking boot  PMHx/Surgical Hx: HTN  Work Hx: works as  at [employer] 30 hours per week and is on feet for 8 hour shifts  Barriers to progress: none  Motivation: good  Substance use: none  Cognition: A & O x 4   Onset Date: 22    SUBJECTIVE  Mechanism of Injury/History of Complaint: On 22 pt was at work and achilles tendon was cut with a piece of glass and went to urgent care and stitched up. Pt continued to have difficulty walking and MRI was performed end of 2022 which demonstrated 2/3 of achilles tendon was cut. She was placed in a walking boot  22 - 2023 and began weaning out of it at that time. Notes that walking continues to be difficult and she gets pulling sensation in posterior calf. After work she continues to have pain.  She notes swelling in achilles and is unable to wear tennis shoes secondary to this. Area of pain: lateral ankle and achilles    Pain Descriptors:  sharp, throb, pulling   Pain Level (0-10 scale)  At rest:0/10 With activity: 4/10   Worst: 10/10   Least: 0/10  Things that worsen pain: walking, activity  Things that ease pain: rest  Objective/Functional Measures including ROM/MMT:   Posture:  normal arches in standing  Other Observations:  scar adhesions noted over achilles  Gait and Functional Mobility:  reduced stance time and push off of RLE  Palpation: TTP R achilles and ATF    R ankle ROM:   AROM      Flexion /DF  15      Extension/PF  40        IR/Sup/Ev  20      ER/Pron/Inv  25       Performed 7 DHR and then pain in R achilles began    Joint Mobility Assessment: normal forefoot and talocurual mobility    Flexibility: tight gastoc soleus      LOWER QUARTER   MUSCLE STRENGTH  DOSS       R  L        Ankle DF  4  5                PF  3 p!  5                INV  4  5                EV  4  5    Neurological: Reflexes / Sensations: denied sensation changes  Special Tests:       Other: SLS L: 30+ sec, R: 30 sec with increased motion in RLE    Ampac Score:   23.57%  ASSESSMENT/Changes in Function:   Pt is a pleasant 25year old female with chief complaints of R achilles pain, R ankle weakness, and difficulty with ambulation. Pt presents with reduced R ankle strength, reduced balance, and altered gait mechanics. She will benefit from skilled PT services to address physical limitations to help reduce pain and return to OF for improved QOL.    Problem List/Impairments: pain affecting function, decrease ROM, decrease strength, edema affecting function, impaired gait/ balance, decrease ADL/ functional abilitiies, decrease activity tolerance, decrease flexibility/ joint mobility, and decrease transfer abilities  Treatment Plan may include any combination of the following: Therapeutic exercise, Neuromuscular reeducation, Manual therapy, Therapeutic activity, Self care/home management, Electric stim unattended , Gait training, and Ultrasound  Patient/ Caregiver education and instruction: self care, activity modification, and exercises  Frequency / Duration: Patient to be seen 2 times per week for 12-16 treatments. Certification Period: 2/2/23 - 5/1/23  Patient Goal (s): walk normal again    [] Met [] Not met [] Partially met   Short Term Goals: To be accomplished in 4-6 treatments. Pt will demonstrate compliance of HEP with demonstration of exercises without cueing. Pt will be able to demonstrate SLS for 30 sec on compliant surface with RLE. Pt will have more normalized stride length and push of during ambulation. Long Term Goals: To be accomplished in 12-16 treatments. Pt will be able to complete 8 hour work shift without increased pain. Pt will be able to run for 1/4 mile without increased pain. Pt will be able to complete 10 single leg heel raises on R without increased pain. Pt will be able to perform hopping on bilateral LE without increased pain. TODAY'S TREATMENT: EVALUATION completed                  EVALUATION COMPLEXITY (Low, Moderate, High): low  Visit #: 1/12-16  In time:1115 am   Out time:1154 am  Total Treatment Time (min): 39   Total Timed Codes (min): 23 1:1 Treatment Time ( only): 23   Pain Level (0-10 scale) pre treatment: 0/10   Pain Level (0-10 scale) post treatment: 0/10    15 min Therapeutic Exercise:  [x] See flow sheet : est HEP   Rationale: increase ROM, increase strength, and improve coordination to improve the patients ability to return to PLOF without increased pain. 8 min Manual Therapy: rock tool over R achilles     Rationale: decrease pain, increase ROM, increase tissue extensibility, and decrease trigger points to improve the patients ability to ambulate with more normalized gait pattern.   With   [] TE   [] TA   [] Neuro   [] SC   [] other: Patient Education: [x] Review HEP    [] Progressed/Changed HEP based on:   [] positioning   [] body mechanics   [] transfers   [x] heat/ice application    [] other:        [x]  Plan of care has been reviewed with PTA. The Plan of Care is based on information from the initial evaluation. Shirley Alexx, PT    2/2/2023   ________________________________________________________________________    I certify that the above Therapy Services are being furnished while the patient is under my care. I agree with the treatment plan and certify that this therapy is necessary.     [de-identified] Signature:_________________________________________________  Date:____________Time: ____________     Eric Lama DPM

## 2023-02-07 ENCOUNTER — HOSPITAL ENCOUNTER (OUTPATIENT)
Dept: PHYSICAL THERAPY | Age: 23
End: 2023-02-07
Payer: COMMERCIAL

## 2023-02-07 PROCEDURE — 97110 THERAPEUTIC EXERCISES: CPT

## 2023-02-07 PROCEDURE — 97140 MANUAL THERAPY 1/> REGIONS: CPT

## 2023-02-07 PROCEDURE — 97035 APP MDLTY 1+ULTRASOUND EA 15: CPT

## 2023-02-07 NOTE — PROGRESS NOTES
PT DAILY TREATMENT NOTE     Patient Name: Zach Shearer  Date:2023  : 2000  [x]  Patient  Verified  Payor: Du Benavides / Plan: [facility] [address] / Product Type: PPO /    Treatment Area: Right ankle pain [M25.571]  Strain of right Achilles tendon, subsequent encounter [E92.863D]   Next MD APPT:   In time:2:40    Out time:3:30  Total Treatment Time (min): 50  Total Timed Codes (min): 40  Visit #: -    SUBJECTIVE    Any medication changes, allergies to medications, adverse drug reactions, diagnosis change, or new procedure performed?:   [x] No    [] Yes (see summary sheet for update)    Pain Level (0-10 scale) pre treatment: 6/10                      Pain Level (0-10 scale) post treatment: 5/10    Subjective functional status/changes:   [] No changes reported  Pt reports continued pain in the (R) achilles; also states she has new pain (pointing to achilles insertion). States she has increased swelling from working all day.      OBJECTIVE    Modality rationale: decrease edema, decrease inflammation, decrease pain, and increase tissue extensibility to improve the patients ability to perform functional tasks without pain in the R achilles    Min Type Additional Details    [] Estim: []UnAtt   []Att       []TENS instruct                  []IFC  []Premod   []NMES                     []Other:  []w/US   []w/ice   []w/heat  Position:  Location:   10 [x]  Ice     []  Heat  []  Ice massage Position: prone  Location: (R) achilles     []  Traction: [] Cervical       []Lumbar                       [] Prone          []Supine                       []Intermittent   []Continuous Lbs:  []w/heat  []W/heat and Estim   8 [x]  Ultrasound: []Continuous   [x] Pulsed at:                           []1MHz   [x]3MHz Location: (R) achilles   W/cm2:1.0      [x] Skin assessment post-treatment:   [x]intact  []redness- no adverse reaction   []redness - adverse reaction:     17 min Therapeutic Exercise:  [x] See flow sheet :   Rationale: increase ROM, increase strength, improve coordination, and improve balance to improve the patients ability to perform functional tasks without pain in the (R) achilles. 15 min Manual Therapy: gentle STM to R achilles and scar with rock tool, gentle manual gastroc stretch, k-tape to achilles. Rationale: decrease pain, increase ROM, increase tissue extensibility, and decrease edema  to improve the patients ability to perform functional tasks without pain in the (R) achilles. With   [x] TE   [] TA   [] Neuro   [] SC   [] other: Patient Education: [x] Review HEP    [] Progressed/Changed HEP based on:   [] positioning   [] body mechanics   [] transfers   [x] Use of heat/ice    [] other:          Other Objective/Functional Measures:   K-tape to R achilles (educated pt on when to take off)    ASSESSMENT/Changes in Function:   Pt tolerated session well with no exacerbation of symptoms. Continues to have swelling and TTP at (R) achilles. Educated pt on self scar massage and icing at home at end of day. All exercises tolerated well with no increase of symptoms. Plan to continue to progress per POC as tolerated. Patient will continue to benefit from skilled PT services to modify and progress therapeutic interventions, address functional mobility deficits, address ROM deficits, address strength deficits, and analyze and address soft tissue restrictions to attain remaining goals. GOALS/Progress towards goals:  Patient Goal (s): walk normal again    [] Met [] Not met [] Partially met   Short Term Goals: To be accomplished in 4-6 treatments. Pt will demonstrate compliance of HEP with demonstration of exercises without cueing. Pt will be able to demonstrate SLS for 30 sec on compliant surface with RLE. Pt will have more normalized stride length and push of during ambulation. Long Term Goals: To be accomplished in 12-16 treatments.   Pt will be able to complete 8 hour work shift without increased pain. Pt will be able to run for 1/4 mile without increased pain. Pt will be able to complete 10 single leg heel raises on R without increased pain. Pt will be able to perform hopping on bilateral LE without increased pain.   PLAN  [x]  Continue plan of care  [x]  Upgrade activities as tolerated       []  Update interventions per flow sheet       []  Discharge due to:  []  Other:     Angella Butcher, VIRGILIO 2/7/2023

## 2023-02-10 ENCOUNTER — HOSPITAL ENCOUNTER (OUTPATIENT)
Dept: PHYSICAL THERAPY | Age: 23
End: 2023-02-10
Payer: COMMERCIAL

## 2023-02-10 PROCEDURE — 97035 APP MDLTY 1+ULTRASOUND EA 15: CPT

## 2023-02-10 PROCEDURE — 97140 MANUAL THERAPY 1/> REGIONS: CPT

## 2023-02-10 PROCEDURE — 97110 THERAPEUTIC EXERCISES: CPT

## 2023-02-10 NOTE — PROGRESS NOTES
PT DAILY TREATMENT NOTE     Patient Name: Hemant Lau  Date:2/10/2023  : 2000  [x]  Patient  Verified  Payor: Sarthak Du / Plan: Maria E  / Product Type: PPO /    Treatment Area: Right ankle pain [M25.571]  Strain of right Achilles tendon, subsequent encounter [F49.011D]   Next MD APPT:   In DNXH:  Out time: 11:00  Total Treatment Time (min): 61  Total Timed Codes (min): 41  Visit #: 3/12-16    SUBJECTIVE    Any medication changes, allergies to medications, adverse drug reactions, diagnosis change, or new procedure performed?:   [x] No    [] Yes (see summary sheet for update)    Pain Level (0-10 scale) pre treatment: 4/10                      Pain Level (0-10 scale) post treatment: 2-310    Subjective functional status/changes:   [] No changes reported  Patient arrived with a slight antalgic gait pattern , and reports 4/10 pain level. She had the KT on for 2 days and then she took it off but It was helpful.    Pt arrived a few min late    OBJECTIVE    Modality rationale: decrease edema, decrease inflammation, decrease pain, and increase tissue extensibility to improve the patients ability to perform functional tasks without pain in the R achilles    Min Type Additional Details    [] Estim: []UnAtt   []Att       []TENS instruct                  []IFC  []Premod   []NMES                     []Other:  []w/US   []w/ice   []w/heat  Position:  Location:   20 [x]  Ice     []  Heat  []  Ice massage Position: prone  Location: (R) achilles     []  Traction: [] Cervical       []Lumbar                       [] Prone          []Supine                       []Intermittent   []Continuous Lbs:  []w/heat  []W/heat and Estim   8 [x]  Ultrasound: []Continuous   [x] Pulsed at:                           []1MHz   [x]3MHz Location: (R) achilles   W/cm2:1.0      [x] Skin assessment post-treatment:   [x]intact  []redness- no adverse reaction   []redness - adverse reaction:     23 min Therapeutic Exercise:  [x] See flow sheet :   Rationale: increase ROM, increase strength, improve coordination, and improve balance to improve the patients ability to perform functional tasks without pain in the (R) achilles. 10 min Manual Therapy: gentle STM to R achilles and scar with gentle ankle subtalar mobilization gastroc stretch, k-tape to achilles. Rationale: decrease pain, increase ROM, increase tissue extensibility, and decrease edema  to improve the patients ability to perform functional tasks without pain in the (R) achilles. With   [x] TE   [] TA   [] Neuro   [] SC   [] other: Patient Education: [x] Review HEP    [] Progressed/Changed HEP based on:   [] positioning   [] body mechanics   [] transfers   [x] Use of heat/ice    [] other:          Other Objective/Functional Measures:   K-tape to R achilles (educated pt on when to take off)  Added more standing   SLS with TB   Unable to do a single heel raise     ASSESSMENT/Changes in Function:   Tolerated session fairly well no  no exacerbation of symptoms. she continues to have swelling and TTP at (R) achilles and R heel. Educated pt on self scar massage and icing at home at end of day as well as considering a brace. Post US, ice  we tapped patient. She reports reduce pain post session. Patient will continue to benefit from skilled PT services to modify and progress therapeutic interventions, address functional mobility deficits, address ROM deficits, address strength deficits, and analyze and address soft tissue restrictions to attain remaining goals. GOALS/Progress towards goals:  Patient Goal (s): walk normal again    [] Met [] Not met [] Partially met   Short Term Goals: To be accomplished in 4-6 treatments. Pt will demonstrate compliance of HEP with demonstration of exercises without cueing. Pt will be able to demonstrate SLS for 30 sec on compliant surface with RLE.   Pt will have more normalized stride length and push of during ambulation. Long Term Goals: To be accomplished in 12-16 treatments. Pt will be able to complete 8 hour work shift without increased pain. Pt will be able to run for 1/4 mile without increased pain. Pt will be able to complete 10 single leg heel raises on R without increased pain. Pt will be able to perform hopping on bilateral LE without increased pain.   PLAN  [x]  Continue plan of care  [x]  Upgrade activities as tolerated       []  Update interventions per flow sheet       []  Discharge due to:  []  Other:     Christy Barrera PT, DPT 2/10/2023

## 2023-02-14 ENCOUNTER — APPOINTMENT (OUTPATIENT)
Dept: PHYSICAL THERAPY | Age: 23
End: 2023-02-14
Payer: COMMERCIAL

## 2023-02-16 ENCOUNTER — APPOINTMENT (OUTPATIENT)
Dept: PHYSICAL THERAPY | Age: 23
End: 2023-02-16
Payer: COMMERCIAL

## 2023-02-16 PROCEDURE — 97110 THERAPEUTIC EXERCISES: CPT | Performed by: PHYSICAL THERAPIST

## 2023-02-16 PROCEDURE — 97140 MANUAL THERAPY 1/> REGIONS: CPT | Performed by: PHYSICAL THERAPIST

## 2023-02-16 NOTE — PROGRESS NOTES
PT DAILY TREATMENT NOTE     Patient Name: Dagoberto Larger  Date:2023  : 2000  [x]  Patient  Verified  Payor: Ed Juarez / Plan: [facility] [address] / Product Type: PPO /    Treatment Area: Right ankle pain [M25.571]  Strain of right Achilles tendon, subsequent encounter [S86.011D]   Next MD APPT:   In time: 318 pm Out time: 408 pm  Total Treatment Time (min): 50  Total Timed Codes (min): 40  Visit #: -    SUBJECTIVE    Any medication changes, allergies to medications, adverse drug reactions, diagnosis change, or new procedure performed?:   [x] No    [] Yes (see summary sheet for update)    Pain Level (0-10 scale) pre treatment: 6/10                      Pain Level (0-10 scale) post treatment: 1/10    Subjective functional status/changes:   [] No changes reported  Pt reports that she continues to get significant pain when standing for long periods of time.      OBJECTIVE    Modality rationale: decrease edema, decrease inflammation, decrease pain, and increase tissue extensibility to improve the patients ability to perform functional tasks without pain in the R achilles    Min Type Additional Details    [] Estim: []UnAtt   []Att       []TENS instruct                  []IFC  []Premod   []NMES                     []Other:  []w/US   []w/ice   []w/heat  Position:  Location:   10 [x]  Ice     []  Heat  []  Ice massage Position: prone  Location: (R) achilles     []  Traction: [] Cervical       []Lumbar                       [] Prone          []Supine                       []Intermittent   []Continuous Lbs:  []w/heat  []W/heat and Estim    [x]  Ultrasound: []Continuous   [x] Pulsed at:                           []1MHz   [x]3MHz Location: (R) achilles   W/cm2:1.0      [x] Skin assessment post-treatment:   [x]intact  []redness- no adverse reaction   []redness - adverse reaction:     32 min Therapeutic Exercise:  [x] See flow sheet :   Rationale: increase ROM, increase strength, improve coordination, and improve balance to improve the patients ability to perform functional tasks without pain in the (R) achilles. 8 min Manual Therapy: rock tool to achilles tendon and distal gastroc   Rationale: decrease pain, increase ROM, increase tissue extensibility, and decrease edema  to improve the patients ability to perform functional tasks without pain in the (R) achilles. With   [x] TE   [] TA   [] Neuro   [] SC   [] other: Patient Education: [x] Review HEP    [] Progressed/Changed HEP based on:   [] positioning   [] body mechanics   [] transfers   [x] Use of heat/ice    [] other:          Other Objective/Functional Measures:   Pt was challenged with SLS on level surfaces. Addition of BAPs board to regain ankle stability/control    ASSESSMENT/Changes in Function:   Pt tolerated treatment well reporting reduction of symptoms with exercises. Reported reduced tenderness with use of rock tool to achilles. Patient will continue to benefit from skilled PT services to modify and progress therapeutic interventions, address functional mobility deficits, address ROM deficits, address strength deficits, and analyze and address soft tissue restrictions to attain remaining goals. GOALS/Progress towards goals:  Patient Goal (s): walk normal again    [] Met [] Not met [] Partially met   Short Term Goals: To be accomplished in 4-6 treatments. Pt will demonstrate compliance of HEP with demonstration of exercises without cueing. [x] Met [] Not met [] Partially met  2/16  Pt will be able to demonstrate SLS for 30 sec on compliant surface with RLE. [] Met [x] Not met [] Partially met 2/16  Pt will have more normalized stride length and push of during ambulation. [] Met [] Not met [x] Partially met 2/16    Long Term Goals: To be accomplished in 12-16 treatments. Pt will be able to complete 8 hour work shift without increased pain. Pt will be able to run for 1/4 mile without increased pain.   Pt will be able to complete 10 single leg heel raises on R without increased pain. Pt will be able to perform hopping on bilateral LE without increased pain.   PLAN  [x]  Continue plan of care  [x]  Upgrade activities as tolerated       []  Update interventions per flow sheet       []  Discharge due to:  []  Other:     Brea Daniels, PT 2/16/2023

## 2023-02-21 ENCOUNTER — HOSPITAL ENCOUNTER (OUTPATIENT)
Dept: PHYSICAL THERAPY | Age: 23
Discharge: HOME | End: 2023-02-21
Payer: COMMERCIAL

## 2023-02-21 PROCEDURE — 97140 MANUAL THERAPY 1/> REGIONS: CPT

## 2023-02-21 PROCEDURE — 97110 THERAPEUTIC EXERCISES: CPT

## 2023-02-21 NOTE — PROGRESS NOTES
PT DAILY TREATMENT NOTE     Patient Name: Jyoti Cardenas  Date:2023  : 2000  [x]  Patient  Verified  Payor: Sarthak Tovar / Plan: Yaima Rocha / Product Type: PPO /    Treatment Area: Right ankle pain [M25.571]  Strain of right Achilles tendon, subsequent encounter [S86.011D]   Next MD APPT:   In time: 3:10pm  Out time: 4:04pm  Total Treatment Time (min): 54  Total Timed Codes (min): 44  Visit #: -    SUBJECTIVE    Any medication changes, allergies to medications, adverse drug reactions, diagnosis change, or new procedure performed?:   [x] No    [] Yes (see summary sheet for update)    Pain Level (0-10 scale) pre treatment: 4/10                      Pain Level (0-10 scale) post treatment: 4/10    Subjective functional status/changes:   [] No changes reported  Pt reports 4/10 pain in the R achilles which she states is an improvement after she was just working all day; normally has worse pain after work day. Continues to have swelling. States she has been icing at home.       OBJECTIVE    Modality rationale: decrease edema, decrease inflammation, decrease pain, and increase tissue extensibility to improve the patients ability to perform functional tasks without pain in the R achilles    Min Type Additional Details    [] Estim: []UnAtt   []Att       []TENS instruct                  []IFC  []Premod   []NMES                     []Other:  []w/US   []w/ice   []w/heat  Position:  Location:   10 [x]  Ice     []  Heat  []  Ice massage Position: prone  Location: (R) achilles     []  Traction: [] Cervical       []Lumbar                       [] Prone          []Supine                       []Intermittent   []Continuous Lbs:  []w/heat  []W/heat and Estim    []  Ultrasound: []Continuous   [] Pulsed at:                           []1MHz   []3MHz Location: (R) achilles   W/cm2:1.0      [x] Skin assessment post-treatment:   [x]intact  []redness- no adverse reaction   []redness - adverse reaction:     34 min Therapeutic Exercise:  [x] See flow sheet :   Rationale: increase ROM, increase strength, improve coordination, and improve balance to improve the patients ability to perform functional tasks without pain in the (R) achilles. 10 min Manual Therapy: STM w/ rock tool to R achilles and tack-stretch    Rationale: decrease pain, increase ROM, increase tissue extensibility, and decrease edema  to improve the patients ability to perform functional tasks without pain in the (R) achilles. With   [x] TE   [] TA   [] Neuro   [] SC   [] other: Patient Education: [x] Review HEP    [] Progressed/Changed HEP based on:   [] positioning   [] body mechanics   [] transfers   [x] Use of heat/ice    [] other:          Other Objective/Functional Measures:     ASSESSMENT/Changes in Function:   Pt tolerated session well with no exacerbation of symptoms. Continues to present with residual swelling and TTP to R achilles at scar; addressed with manual tx. Pt tolerated all exercises well requiring min cues, demonstrating good carryover. Plan to continue to progress per POC as tolerated. Patient will continue to benefit from skilled PT services to modify and progress therapeutic interventions, address functional mobility deficits, address ROM deficits, address strength deficits, and analyze and address soft tissue restrictions to attain remaining goals. GOALS/Progress towards goals:  Patient Goal (s): walk normal again    [] Met [] Not met [] Partially met   Short Term Goals: To be accomplished in 4-6 treatments. Pt will demonstrate compliance of HEP with demonstration of exercises without cueing. Pt will be able to demonstrate SLS for 30 sec on compliant surface with RLE. Pt will have more normalized stride length and push of during ambulation. Long Term Goals: To be accomplished in 12-16 treatments. Pt will be able to complete 8 hour work shift without increased pain.   Pt will be able to run for 1/4 mile without increased pain. Pt will be able to complete 10 single leg heel raises on R without increased pain. Pt will be able to perform hopping on bilateral LE without increased pain.   PLAN  [x]  Continue plan of care  [x]  Upgrade activities as tolerated       []  Update interventions per flow sheet       []  Discharge due to:  []  Other:     Arva Apt, PTA 2/21/2023

## 2023-02-23 ENCOUNTER — HOSPITAL ENCOUNTER (OUTPATIENT)
Dept: PHYSICAL THERAPY | Age: 23
Discharge: HOME | End: 2023-02-23
Payer: COMMERCIAL

## 2023-02-23 PROCEDURE — 97140 MANUAL THERAPY 1/> REGIONS: CPT

## 2023-02-23 PROCEDURE — 97110 THERAPEUTIC EXERCISES: CPT

## 2023-02-23 NOTE — PROGRESS NOTES
PT DAILY TREATMENT NOTE     Patient Name: Rufus Salas  Date:2023  : 2000  [x]  Patient  Verified  Payor: Real Sandoval / Plan: MATTTITI VERA [facility] [address] / Product Type: PPO /    Treatment Area: Right ankle pain [M25.571]  Strain of right Achilles tendon, subsequent encounter [V06.011D]   Next MD APPT: no set return  In time: 3:45pm  Out time: 4:40pm  Total Treatment Time (min): 50  Total Timed Codes (min): 50  Visit #: -    SUBJECTIVE    Any medication changes, allergies to medications, adverse drug reactions, diagnosis change, or new procedure performed?:   [x] No    [] Yes (see summary sheet for update)    Pain Level (0-10 scale) pre treatment: 5/10                      Pain Level (0-10 scale) post treatment: 3/10    Subjective functional status/changes:   [] No changes reported  Pt hurting more today because she had to stand again at work. She reports that she is \"60% better\" now overall. She still battles stiffness and tightness, especially after standing up. Pt wearing slides and advised to bring other shoes that are supportive. She is wearing crocs to work because other shoes bother the scar area. OBJECTIVE    40 min Therapeutic Exercise:  [x] See flow sheet :   Rationale: increase ROM, increase strength, improve coordination, and improve balance to improve the patients ability to perform functional tasks without pain in the (R) achilles. 10 min Manual Therapy: scar mobs and MLD    Rationale: decrease pain, increase ROM, increase tissue extensibility, and decrease edema  to improve the patients ability to perform functional tasks without pain in the (R) achilles. *Pt requested to use ice at home.      With   [x] TE   [] TA   [] Neuro   [] SC   [] other: Patient Education: [x] Review HEP    [] Progressed/Changed HEP based on:   [] positioning   [] body mechanics   [] transfers   [] Use of heat/ice    [x] other: use of compression socks-put on first thing in AM and have another pair at work if too painful; begin wearing schedule of flexible shoe with better support. MLD from heel toward calf. Other Objective/Functional Measures: stair stretches, SLS clocks, ant tib stretch, increase wt shifts to R for DHR    ASSESSMENT/Changes in Function:   Pt felt less pain following session. Discussed swelling prevention including MLD and use of compression socks. She said the doctor had recommended she try these, but they were too uncomfortable at first.  She will try again and see if she can now tolerate them better. Did well with all exercises, but some discomfort with increased wt shift over R side for DHR, so she was told to gradually increase this at home. She wants to go back to the gym and we discussed safe machines for her to try at a very low level to start. Some STG met or partially met to date. Patient will continue to benefit from skilled PT services to modify and progress therapeutic interventions, address functional mobility deficits, address ROM deficits, address strength deficits, and analyze and address soft tissue restrictions to attain remaining goals. GOALS/Progress towards goals:  Patient Goal (s): walk normal again    [] Met [] Not met [] Partially met   Short Term Goals: To be accomplished in 4-6 treatments. Pt will demonstrate compliance of HEP with demonstration of exercises without cueing. [x] Met [] Not met [] Partially met   2/23  Pt will be able to demonstrate SLS for 30 sec on compliant surface with RLE. [x] Met [] Not met [] Partially met  2/23  Pt will have more normalized stride length and push of during ambulation. [] Met [] Not met [x] Partially met  2/23--still can't wear normal shoe--slides or crocs only    Long Term Goals: To be accomplished in 12-16 treatments. Pt will be able to complete 8 hour work shift without increased pain. Pt will be able to run for 1/4 mile without increased pain.   Pt will be able to complete 10 single leg heel raises on R without increased pain. Pt will be able to perform hopping on bilateral LE without increased pain.   PLAN  [x]  Continue plan of care  [x]  Upgrade activities as tolerated       []  Update interventions per flow sheet       []  Discharge due to:  []  Other:     Mehdi Brasher, PT 2/23/2023

## 2023-02-28 ENCOUNTER — HOSPITAL ENCOUNTER (OUTPATIENT)
Dept: PHYSICAL THERAPY | Age: 23
End: 2023-02-28
Payer: COMMERCIAL

## 2023-02-28 PROCEDURE — 97140 MANUAL THERAPY 1/> REGIONS: CPT

## 2023-02-28 PROCEDURE — 97110 THERAPEUTIC EXERCISES: CPT

## 2023-02-28 NOTE — PROGRESS NOTES
PT DAILY TREATMENT NOTE     Patient Name: Dayana Perla  Date:2023  : 2000  [x]  Patient  Verified  Payor: Frederick Elizondo / Plan: MATTTITI VERA [facility] [address] / Product Type: PPO /    Treatment Area: Right ankle pain [M25.571]  Strain of right Achilles tendon, subsequent encounter [S87.011D]   Next MD APPT: no set return  In time: 3:18pm  Out time: 4:06pm  Total Treatment Time (min): 48  Total Timed Codes (min): 38  Visit #: -    SUBJECTIVE    Any medication changes, allergies to medications, adverse drug reactions, diagnosis change, or new procedure performed?:   [x] No    [] Yes (see summary sheet for update)    Pain Level (0-10 scale) pre treatment: 4/10                      Pain Level (0-10 scale) post treatment: /10    Subjective functional status/changes:   [] No changes reported  Pt reports some pain due to just getting off work; but she is wearing tennis shoes now which she feels helps and she was able to find tennis shoes that have a softer back so it is not uncomfortable on scar. States she has also been wearing compression socks in the morning when she wakes up. OBJECTIVE    30 min Therapeutic Exercise:  [x] See flow sheet :   Rationale: increase ROM, increase strength, improve coordination, and improve balance to improve the patients ability to perform functional tasks without pain in the (R) achilles. 8 min Manual Therapy: scar mobs and MLD    Rationale: decrease pain, increase ROM, increase tissue extensibility, and decrease edema  to improve the patients ability to perform functional tasks without pain in the (R) achilles.      Modality rationale: decrease edema, decrease inflammation, and decrease pain to improve the patients ability to perform functional tasks with less swelling and pain     Min Type Additional Details      10 [x]  Ice     []  Heat  []  Ice massage Position: prone  Location: (R) achilles     [x] Skin assessment post-treatment:   [x]intact  []redness- no adverse reaction   []redness - adverse reaction:      With   [x] TE   [] TA   [] Neuro   [] SC   [] other: Patient Education: [] Review HEP    [] Progressed/Changed HEP based on:   [] positioning   [] body mechanics   [] transfers   [] Use of heat/ice    [x] other: reviewed self massage, icing, and use of compression socks         Other Objective/Functional Measures:     ASSESSMENT/Changes in Function:   Pt tolerated session well with no exacerbation of symptoms. She is now able to wear compression socks with less discomfort in the mornings, and has gotten more comfortable tennis shoes to wear while working. Pt with no increase of discomfort with weight shifts to (R) for eccentric heel raise. Pt required min cues for proper foot placement/alignment with exercises demonstrating good awareness and carryover. Plan to continue to progress per POC as tolerated. Patient will continue to benefit from skilled PT services to modify and progress therapeutic interventions, address functional mobility deficits, address ROM deficits, address strength deficits, and analyze and address soft tissue restrictions to attain remaining goals. GOALS/Progress towards goals:  Patient Goal (s): walk normal again    [] Met [] Not met [] Partially met   Short Term Goals: To be accomplished in 4-6 treatments. Pt will demonstrate compliance of HEP with demonstration of exercises without cueing. [x] Met [] Not met [] Partially met   2/23  Pt will be able to demonstrate SLS for 30 sec on compliant surface with RLE. [x] Met [] Not met [] Partially met  2/23  Pt will have more normalized stride length and push of during ambulation. [] Met [] Not met [x] Partially met  2/23--still can't wear normal shoe--slides or crocs only    Long Term Goals: To be accomplished in 12-16 treatments. Pt will be able to complete 8 hour work shift without increased pain. Pt will be able to run for 1/4 mile without increased pain.   Pt will be able to complete 10 single leg heel raises on R without increased pain. Pt will be able to perform hopping on bilateral LE without increased pain.   PLAN  [x]  Continue plan of care  [x]  Upgrade activities as tolerated       []  Update interventions per flow sheet       []  Discharge due to:  []  Other:     Soni Apt, PTA 2/28/2023

## 2023-03-07 ENCOUNTER — HOSPITAL ENCOUNTER (OUTPATIENT)
Dept: PHYSICAL THERAPY | Age: 23
Discharge: HOME | End: 2023-03-07
Payer: COMMERCIAL

## 2023-03-07 PROCEDURE — 97110 THERAPEUTIC EXERCISES: CPT | Performed by: PHYSICAL THERAPIST

## 2023-03-07 NOTE — PROGRESS NOTES
PT DAILY TREATMENT NOTE     Patient Name: Bronwyn Nichols  Date:3/7/2023  : 2000  [x]  Patient  Verified  Payor: [place] / Plan: [facility] [address] / Product Type: PPO /    Treatment Area: Right ankle pain [M25.571]  Strain of right Achilles tendon, subsequent encounter [Q94.581D]   Next MD APPT: no set return  In time: 1011 am  Out time: 1056 am  Total Treatment Time (min): 45  Total Timed Codes (min): 35  Visit #: -    SUBJECTIVE    Any medication changes, allergies to medications, adverse drug reactions, diagnosis change, or new procedure performed?:   [x] No    [] Yes (see summary sheet for update)    Pain Level (0-10 scale) pre treatment: 4/10                      Pain Level (0-10 scale) post treatment: 1/10    Subjective functional status/changes:   [] No changes reported  Pt reports she is having a little more pain today as she was on her feet doing a lot of walking over the weekend. Pt notes 40% improvement since beginning PT. She continues to have flexibility limitations, swelling, and limited with time she is able to be on her feet. Notes highest her pain has been over the past week is 6/10. Notes that she is able to work only about 3 hours before she begins to have pain. **pt was 11 min late to visit    OBJECTIVE    35 min Therapeutic Exercise:  [x] See flow sheet : Reassessment   Rationale: increase ROM, increase strength, improve coordination, and improve balance to improve the patients ability to perform functional tasks without pain in the (R) achilles. -- min Manual Therapy: scar mobs and MLD    Rationale: decrease pain, increase ROM, increase tissue extensibility, and decrease edema  to improve the patients ability to perform functional tasks without pain in the (R) achilles.      Modality rationale: decrease edema, decrease inflammation, and decrease pain to improve the patients ability to perform functional tasks with less swelling and pain     Min Type Additional Details      10 [x]  Ice     []  Heat  []  Ice massage Position: prone  Location: (R) achilles     [x] Skin assessment post-treatment:   [x]intact  []redness- no adverse reaction   []redness - adverse reaction:      With   [x] TE   [] TA   [] Neuro   [] SC   [] other: Patient Education: [] Review HEP    [] Progressed/Changed HEP based on:   [] positioning   [] body mechanics   [] transfers   [] Use of heat/ice    [x] other: updated to include eccentric heel raises on steps         Other Objective/Functional Measures:   Palpation: TTP R achilles                                   Performed 10 Single heel raises on RLE without pain, but difficulty performing      MUSCLE STRENGTH  KEY                                                                             R                      L                                                     Ankle DF                     4+                    5                                                            PF                    4-                  5                                                            INV                    4+                    5                                                            EV                     4 +                     5  Ampac: 18.72%    ASSESSMENT/Changes in Function:   Pt participated well with skilled PT services without increased pain throughout. Pt has made improvements with strength and activity tolerance,but continues to have weakness with R PF and limited with ability to ambulate and stand for long periods of time. She has met 2/3 STG and 1/4 LTG at this time. Plan to continue to progress per POC as tolerated. Patient will continue to benefit from skilled PT services to modify and progress therapeutic interventions, address functional mobility deficits, address ROM deficits, address strength deficits, and analyze and address soft tissue restrictions to attain remaining goals.        GOALS/Progress towards goals:  Patient Goal (s): walk normal again    [] Met [] Not met [x] Partially met 3/7  Short Term Goals: To be accomplished in 4-6 treatments. Pt will demonstrate compliance of HEP with demonstration of exercises without cueing. [x] Met [] Not met [] Partially met   2/23  Pt will be able to demonstrate SLS for 30 sec on compliant surface with RLE. [x] Met [] Not met [] Partially met  2/23  Pt will have more normalized stride length and push of during ambulation. [] Met [] Not met [x] Partially met  2/23--still can't wear normal shoe--slides or crocs only    Long Term Goals: To be accomplished in 12-16 treatments. Pt will be able to complete 8 hour work shift without increased pain. [] Met [x] Not met [] Partially met 3/7  Pt will be able to run for 1/4 mile without increased pain. [] Met [x] Not met [] Partially met 3/7  Pt will be able to complete 10 single leg heel raises on R without increased pain. [x] Met [] Not met [] Partially met 3/7  Pt will be able to perform hopping on bilateral LE without increased pain.   [] Met [x] Not met [] Partially met 3/7    PLAN  [x]  Continue plan of care  [x]  Upgrade activities as tolerated       []  Update interventions per flow sheet       []  Discharge due to:  []  Other:     Hilario Muller, PT 3/7/2023

## 2023-03-07 NOTE — PROGRESS NOTES
[address] [address] [address] [facility] [address] [address] [facility], [address] [place] [address]  Ph: [phone]    Fax: [phone]  Therapy Progress Report  Name: Aaron Ruffin  : 2000   MD: Delta Eng DPM     Medical Diagnosis: Right ankle pain [M25.571]  Strain of right Achilles tendon, subsequent encounter [S86.011D]  Start of Care: 23    Visits from Start of Care: 8     Missed Visits: 0  Certification Period: 23 - 23    Frequency/Duration: 2 times a week for 12-16 treatments   Summary of Care/Goals:    ASSESSMENT/Changes in Function:   Pt participated well with skilled PT services without increased pain throughout. Pt has made improvements with strength and activity tolerance,but continues to have weakness with R PF and limited with ability to ambulate and stand for long periods of time. She has met 2/3 STG and 1/4 LTG at this time. Plan to continue to progress per POC as tolerated. Patient will continue to benefit from skilled PT services to modify and progress therapeutic interventions, address functional mobility deficits, address ROM deficits, address strength deficits, and analyze and address soft tissue restrictions to attain remaining goals. GOALS/Progress towards goals:  Patient Goal (s): walk normal again    [] Met [] Not met [x] Partially met 3  Short Term Goals: To be accomplished in 4-6 treatments. Pt will demonstrate compliance of HEP with demonstration of exercises without cueing. [x] Met [] Not met [] Partially met     Pt will be able to demonstrate SLS for 30 sec on compliant surface with RLE. [x] Met [] Not met [] Partially met    Pt will have more normalized stride length and push of during ambulation. [] Met [] Not met [x] Partially met  --still can't wear normal shoe--slides or crocs only     Long Term Goals: To be accomplished in 12-16 treatments.   Pt will be able to complete 8 hour work shift without increased pain. [] Met [x] Not met [] Partially met 3/7  Pt will be able to run for 1/4 mile without increased pain. [] Met [x] Not met [] Partially met 3/7  Pt will be able to complete 10 single leg heel raises on R without increased pain. [x] Met [] Not met [] Partially met 3/7  Pt will be able to perform hopping on bilateral LE without increased pain. [] Met [x] Not met [] Partially met 3/7    Palpation: TTP R achilles                                   Performed 10 Single heel raises on RLE without pain, but difficulty performing       MUSCLE STRENGTH  KEY                                                                             R                      L                                                     Ankle DF                     4+                    5                                                            PF                    4-                     5                                                            INV                    4+                    5                                                            EV                     4 +                     5  Ampac Score:  18.72%  Recommendations: Continue 2x/wk per POC  [x]  Plan of care has been reviewed with PTA. Radha Ponce, PT 3/7/2023     ________________________________________________________________________     Please retain this original for your records.

## 2023-03-09 ENCOUNTER — HOSPITAL ENCOUNTER (OUTPATIENT)
Dept: PHYSICAL THERAPY | Age: 23
Discharge: HOME | End: 2023-03-09
Payer: COMMERCIAL

## 2023-03-09 PROCEDURE — 97112 NEUROMUSCULAR REEDUCATION: CPT

## 2023-03-09 PROCEDURE — 97110 THERAPEUTIC EXERCISES: CPT

## 2023-03-09 NOTE — PROGRESS NOTES
PT DAILY TREATMENT NOTE     Patient Name: Woody Vieyra  Date:3/9/2023  : 2000  [x]  Patient  Verified  Payor: Monique Cardenas / Plan: MATTTITI VERA [facility] [address] / Product Type: PPO /    Treatment Area: Right ankle pain [M25.571]  Strain of right Achilles tendon, subsequent encounter [R27.713D]   Next MD APPT: no set return  In time: 2:17pm Out time: 3:15pm  Total Treatment Time (min): 50  Total Timed Codes (min): 40  Visit #: -    SUBJECTIVE    Any medication changes, allergies to medications, adverse drug reactions, diagnosis change, or new procedure performed?:   [x] No    [] Yes (see summary sheet for update)    Pain Level (0-10 scale) pre treatment: 2/10                      Pain Level (0-10 scale) post treatment: 0/10    Subjective functional status/changes:   [] No changes reported  Pt reports less overall pain. She is able to wear tennis shoes for the first time without heel pain. She is wearing them to work and can tell that they are helping. \"I can walk straighter now\". OBJECTIVE    30 min Therapeutic Exercise:  [x] See flow sheet : Reassessment   Rationale: increase ROM, increase strength, improve coordination, and improve balance to improve the patients ability to perform functional tasks without pain in the (R) achilles. 8 min Neuromuscular Re-education:  [x]  See flow sheet : Balance and stretching gastroc vs soleus   Rationale: increase strength, improve coordination, improve balance, and increase proprioception  to improve the patients ability to meet functional goals  2 min Manual Therapy: scar mobs and MLD    Rationale: decrease pain, increase ROM, increase tissue extensibility, and decrease edema  to improve the patients ability to perform functional tasks without pain in the (R) achilles.      Modality rationale: decrease edema, decrease inflammation, and decrease pain to improve the patients ability to perform functional tasks with less swelling and pain     Min Type Additional Details      10 [x]  Ice     []  Heat  []  Ice massage Position: supine  Location: (R) achilles     [x] Skin assessment post-treatment:   [x]intact  []redness- no adverse reaction   []redness - adverse reaction:      With   [x] TE   [] TA   [] Neuro   [] SC   [] other: Patient Education: [] Review HEP    [] Progressed/Changed HEP based on:   [] positioning   [] body mechanics   [] transfers   [] Use of heat/ice    [x] other: updated to include eccentric heel raises on steps         Other Objective/Functional Measures: Increased some reps, added BOSU;     ASSESSMENT/Changes in Function:   Pt progressing well and now able to do [facility] with HHA. Tolerated high level balance activities on BOSU and with rebounder well today. Able to tolerate tennis shoes with good results. Patient will continue to benefit from skilled PT services to modify and progress therapeutic interventions, address functional mobility deficits, address ROM deficits, address strength deficits, and analyze and address soft tissue restrictions to attain remaining goals. GOALS/Progress towards goals:  Ampac: 18.72%  Patient Goal (s): walk normal again    [] Met [] Not met [x] Partially met 3/7  Short Term Goals: To be accomplished in 4-6 treatments. Pt will demonstrate compliance of HEP with demonstration of exercises without cueing. [x] Met [] Not met [] Partially met   2/23  Pt will be able to demonstrate SLS for 30 sec on compliant surface with RLE. [x] Met [] Not met [] Partially met  2/23  Pt will have more normalized stride length and push of during ambulation. [] Met [] Not met [x] Partially met  2/23--still can't wear normal shoe--slides or crocs only    Long Term Goals: To be accomplished in 12-16 treatments. Pt will be able to complete 8 hour work shift without increased pain. [] Met [x] Not met [] Partially met 3/7  Pt will be able to run for 1/4 mile without increased pain.   [] Met [x] Not met [] Partially met 3/7  Pt will be able to complete 10 single leg heel raises on R without increased pain. [x] Met [] Not met [] Partially met 3/7  Pt will be able to perform hopping on bilateral LE without increased pain.   [] Met [x] Not met [] Partially met 3/7    PLAN  [x]  Continue plan of care  [x]  Upgrade activities as tolerated       []  Update interventions per flow sheet       []  Discharge due to:  []  Other:     Antonette Aviles, PT 3/9/2023

## 2023-03-14 ENCOUNTER — HOSPITAL ENCOUNTER (OUTPATIENT)
Dept: PHYSICAL THERAPY | Age: 23
Discharge: HOME | End: 2023-03-14
Payer: COMMERCIAL

## 2023-03-14 PROCEDURE — 97112 NEUROMUSCULAR REEDUCATION: CPT

## 2023-03-14 PROCEDURE — 97110 THERAPEUTIC EXERCISES: CPT

## 2023-03-14 NOTE — PROGRESS NOTES
PT DAILY TREATMENT NOTE     Patient Name: Missy Johnson  Date:3/14/2023  : 2000  [x]  Patient  Verified  Payor: Sravan Mins / Plan: MATTTITI VERA [facility] [address] / Product Type: PPO /    Treatment Area: Right ankle pain [M25.571]  Strain of right Achilles tendon, subsequent encounter [S86.011D]   Next MD APPT: no set return  In time: 10:32am Out time:11:20 am  Total Treatment Time (min): 48  Total Timed Codes (min): 38  Visit #: 10/12-    SUBJECTIVE    Any medication changes, allergies to medications, adverse drug reactions, diagnosis change, or new procedure performed?:   [x] No    [] Yes (see summary sheet for update)    Pain Level (0-10 scale) pre treatment: 2/10                      Pain Level (0-10 scale) post treatment: 0/10    Subjective functional status/changes:   [] No changes reported  Pt reports less overall pain; continues to have a slight increase of pain after work shifts. OBJECTIVE    28 min Therapeutic Exercise:  [x] See flow sheet : Reassessment   Rationale: increase ROM, increase strength, improve coordination, and improve balance to improve the patients ability to perform functional tasks without pain in the (R) achilles. 8 min Neuromuscular Re-education:  [x]  See flow sheet : Balance and stretching gastroc vs soleus   Rationale: increase strength, improve coordination, improve balance, and increase proprioception  to improve the patients ability to meet functional goals  2 min Manual Therapy: scar mobs and MLD    Rationale: decrease pain, increase ROM, increase tissue extensibility, and decrease edema  to improve the patients ability to perform functional tasks without pain in the (R) achilles.      Modality rationale: decrease edema, decrease inflammation, and decrease pain to improve the patients ability to perform functional tasks with less swelling and pain     Min Type Additional Details      10 [x]  Ice     []  Heat  []  Ice massage Position: prone  Location: (R) achilles     [x] Skin assessment post-treatment:   [x]intact  []redness- no adverse reaction   []redness - adverse reaction:      With   [x] TE   [] TA   [] Neuro   [] SC   [] other: Patient Education: [] Review HEP    [] Progressed/Changed HEP based on:   [] positioning   [] body mechanics   [] transfers   [] Use of heat/ice    [x] other: updated to include eccentric heel raises on steps         Other Objective/Functional Measures: Added:  SL leg press  Sidestepping with TB  Seated soleus raise    ASSESSMENT/Changes in Function:   Pt tolerated session well with no exacerbation of symptoms. Continues to make steady progress in strength in the (L) ankle; continues to demonstrate weakness in PF but improving. Less TTP at scar with increase mobility. Continues to have some residual swelling; advised her to continue wearing compression socks more often as she has been wearing them 2X/week. Plan to continue to progress strengthening per POC. Patient will continue to benefit from skilled PT services to modify and progress therapeutic interventions, address functional mobility deficits, address ROM deficits, address strength deficits, and analyze and address soft tissue restrictions to attain remaining goals. GOALS/Progress towards goals:  Ampac: 18.72%  Patient Goal (s): walk normal again    [] Met [] Not met [x] Partially met 3/7  Short Term Goals: To be accomplished in 4-6 treatments. Pt will demonstrate compliance of HEP with demonstration of exercises without cueing. [x] Met [] Not met [] Partially met   2/23  Pt will be able to demonstrate SLS for 30 sec on compliant surface with RLE. [x] Met [] Not met [] Partially met  2/23  Pt will have more normalized stride length and push of during ambulation. [] Met [] Not met [x] Partially met  2/23--still can't wear normal shoe--slides or crocs only    Long Term Goals: To be accomplished in 12-16 treatments.   Pt will be able to complete 8 hour work shift without increased pain. [] Met [x] Not met [] Partially met 3/7  Pt will be able to run for 1/4 mile without increased pain. [] Met [x] Not met [] Partially met 3/7  Pt will be able to complete 10 single leg heel raises on R without increased pain. [x] Met [] Not met [] Partially met 3/7  Pt will be able to perform hopping on bilateral LE without increased pain.   [] Met [x] Not met [] Partially met 3/7    PLAN  [x]  Continue plan of care  [x]  Upgrade activities as tolerated       []  Update interventions per flow sheet       []  Discharge due to:  []  Other:     Dariel Pierre, PTA 3/14/2023

## 2023-03-16 ENCOUNTER — HOSPITAL ENCOUNTER (OUTPATIENT)
Dept: PHYSICAL THERAPY | Age: 23
Discharge: HOME | End: 2023-03-16
Payer: COMMERCIAL

## 2023-03-16 PROCEDURE — 97112 NEUROMUSCULAR REEDUCATION: CPT

## 2023-03-16 PROCEDURE — 97110 THERAPEUTIC EXERCISES: CPT

## 2023-03-16 NOTE — PROGRESS NOTES
PT DAILY TREATMENT NOTE     Patient Name: Adrian Coronel  Date:3/16/2023  : 2000  [x]  Patient  Verified  Payor: Adrienne Bay / Plan: MATTTITI VERA [facility] [address] / Product Type: PPO /    Treatment Area: Right ankle pain [M25.571]  Strain of right Achilles tendon, subsequent encounter [H31.011D]   Next MD APPT: no set return  In time: 10:30am Out time: 11:19 am  Total Treatment Time (min): 49  Total Timed Codes (min): 39  Visit #: -    SUBJECTIVE    Any medication changes, allergies to medications, adverse drug reactions, diagnosis change, or new procedure performed?:   [x] No    [] Yes (see summary sheet for update)    Pain Level (0-10 scale) pre treatment: 0/10                      Pain Level (0-10 scale) post treatment: 0/10    Subjective functional status/changes:   [] No changes reported  Pt continues feel better; just continues to have stiffness in the mornings but improves with stretching. OBJECTIVE    25 min Therapeutic Exercise:  [x] See flow sheet : Reassessment   Rationale: increase ROM, increase strength, improve coordination, and improve balance to improve the patients ability to perform functional tasks without pain in the (R) achilles. 10 min Neuromuscular Re-education:  [x]  See flow sheet : Balance and stretching gastroc vs soleus   Rationale: increase strength, improve coordination, improve balance, and increase proprioception  to improve the patients ability to meet functional goals  4 min Manual Therapy: scar mobs and MLD; k-tape to scar   Rationale: decrease pain, increase ROM, increase tissue extensibility, and decrease edema  to improve the patients ability to perform functional tasks without pain in the (R) achilles.      Modality rationale: decrease edema, decrease inflammation, and decrease pain to improve the patients ability to perform functional tasks with less swelling and pain     Min Type Additional Details      10 [x]  Ice     []  Heat  []  Ice massage Position: prone  Location: (R) achilles     [x] Skin assessment post-treatment:   [x]intact  []redness- no adverse reaction   []redness - adverse reaction:      With   [x] TE   [] TA   [] Neuro   [] SC   [] other: Patient Education: [] Review HEP    [] Progressed/Changed HEP based on:   [] positioning   [] body mechanics   [] transfers   [] Use of heat/ice    [] other:         Other Objective/Functional Measures: Added assisted SL hopping with TB  Mini tramp side<>side  SL sit<> stand from [facility]       ASSESSMENT/Changes in Function:   Pt tolerated session well with no exacerbation of symptoms. Continues to make steady progress in strength in the (R) ankle; Pt has goal of returning to running. Tolerated all new exercises well. Plan to continue to progress strengthening and return to running per POC. Patient will continue to benefit from skilled PT services to modify and progress therapeutic interventions, address functional mobility deficits, address ROM deficits, address strength deficits, and analyze and address soft tissue restrictions to attain remaining goals. GOALS/Progress towards goals:  Ampac: 18.72%  Patient Goal (s): walk normal again    [] Met [] Not met [x] Partially met 3/7  Short Term Goals: To be accomplished in 4-6 treatments. Pt will demonstrate compliance of HEP with demonstration of exercises without cueing. [x] Met [] Not met [] Partially met   2/23  Pt will be able to demonstrate SLS for 30 sec on compliant surface with RLE. [x] Met [] Not met [] Partially met  2/23  Pt will have more normalized stride length and push of during ambulation. [x] Met [] Not met [] Partially met 3/16 can wear tennis shoes    Long Term Goals: To be accomplished in 12-16 treatments. Pt will be able to complete 8 hour work shift without increased pain. [] Met [] Not met [x] Partially met 3/16 less pain, no significant pain  Pt will be able to run for 1/4 mile without increased pain.   [] Met [x] Not met [] Partially met 3/16  Pt will be able to complete 10 single leg heel raises on R without increased pain. [x] Met [] Not met [] Partially met 3/7  Pt will be able to perform hopping on bilateral LE without increased pain.   [] Met [] Not met [x] Partially met 3/16 began mini hopping progression    PLAN  [x]  Continue plan of care  [x]  Upgrade activities as tolerated       []  Update interventions per flow sheet       []  Discharge due to:  []  Other:     Ibis Conte, PTA 3/16/2023

## 2023-03-21 ENCOUNTER — HOSPITAL ENCOUNTER (OUTPATIENT)
Dept: PHYSICAL THERAPY | Age: 23
Discharge: HOME | End: 2023-03-21
Payer: COMMERCIAL

## 2023-03-21 PROCEDURE — 97112 NEUROMUSCULAR REEDUCATION: CPT

## 2023-03-21 PROCEDURE — 97110 THERAPEUTIC EXERCISES: CPT

## 2023-03-21 NOTE — PROGRESS NOTES
PT DAILY TREATMENT NOTE     Patient Name: Bernie Lucero  Date:3/21/2023  : 2000  [x]  Patient  Verified  Payor: Wyatt Toure / Plan: MATTTITI VERA [facility] [address] / Product Type: PPO /    Treatment Area: Right ankle pain [M25.571]  Strain of right Achilles tendon, subsequent encounter [S86.011D]   Next MD APPT: no set return  In time: 10:00am Out time: 10:50am  Total Treatment Time (min): 50  Total Timed Codes (min): 40  Visit #: -    SUBJECTIVE    Any medication changes, allergies to medications, adverse drug reactions, diagnosis change, or new procedure performed?:   [x] No    [] Yes (see summary sheet for update)    Pain Level (0-10 scale) pre treatment: 3/10                      Pain Level (0-10 scale) post treatment: 0/10    Subjective functional status/changes:   [] No changes reported  Pt continues feel better; just continues to have stiffness and discomfort in mornings prior to stretching. Feels about 70% improvement from start of PT.     OBJECTIVE    25 min Therapeutic Exercise:  [x] See flow sheet :    Rationale: increase ROM, increase strength, improve coordination, and improve balance to improve the patients ability to perform functional tasks without pain in the (R) achilles. 15 min Neuromuscular Re-education:  [x]  See flow sheet : Balance,  hopping progression    Rationale: increase strength, improve coordination, improve balance, and increase proprioception  to improve the patients ability to meet functional goals   min Manual Therapy: scar mobs and MLD; k-tape to scar   Rationale: decrease pain, increase ROM, increase tissue extensibility, and decrease edema  to improve the patients ability to perform functional tasks without pain in the (R) achilles.      Modality rationale: decrease edema, decrease inflammation, and decrease pain to improve the patients ability to perform functional tasks with less swelling and pain     Min Type Additional Details      10 [x]  Ice     [] Heat  []  Ice massage Position: prone  Location: (R) achilles     [x] Skin assessment post-treatment:   [x]intact  []redness- no adverse reaction   []redness - adverse reaction:      With   [x] TE   [] TA   [] Neuro   [] SC   [] other: Patient Education: [] Review HEP    [] Progressed/Changed HEP based on:   [] positioning   [] body mechanics   [] transfers   [] Use of heat/ice    [] other:         Other Objective/Functional Measures: Added:  - brisk walking on treadmill 4.0mph  - DL hop in place  - DL hop fwd/bck  - DL hop side<>side      ASSESSMENT/Changes in Function:   Pt tolerated session well with no exacerbation of symptoms. Continues to make steady progress in strength in the (R) ankle; Pt has goal of returning to running. Tolerated addition of hopping progression and brisk walking on treadmill well with no increase of pain. Pt reporting about 70% improvement since beginning therapy. Plan to continue to progress per POC. Patient will continue to benefit from skilled PT services to modify and progress therapeutic interventions, address functional mobility deficits, address ROM deficits, address strength deficits, and analyze and address soft tissue restrictions to attain remaining goals. GOALS/Progress towards goals:  Ampac: 18.72%  Patient Goal (s): walk normal again    [] Met [] Not met [x] Partially met 3/21 feels like she turns her (R) foot out when walking  Short Term Goals: To be accomplished in 4-6 treatments. Pt will demonstrate compliance of HEP with demonstration of exercises without cueing. [x] Met [] Not met [] Partially met   2/23  Pt will be able to demonstrate SLS for 30 sec on compliant surface with RLE. [x] Met [] Not met [] Partially met  2/23  Pt will have more normalized stride length and push of during ambulation. [x] Met [] Not met [] Partially met 3/16 can wear tennis shoes    Long Term Goals: To be accomplished in 12-16 treatments.   Pt will be able to complete 8 hour work shift without increased pain. [] Met [] Not met [x] Partially met 3/16 less pain, no significant pain  Pt will be able to run for 1/4 mile without increased pain. [] Met [x] Not met [] Partially met 3/16  Pt will be able to complete 10 single leg heel raises on R without increased pain. [x] Met [] Not met [] Partially met 3/7  Pt will be able to perform hopping on bilateral LE without increased pain.   [] Met [] Not met [x] Partially met 3/16 began mini hopping progression    PLAN  [x]  Continue plan of care  [x]  Upgrade activities as tolerated       [x]  Update interventions per flow sheet       []  Discharge due to:  []  Other:     Hetal Pugh, PTA 3/21/2023

## 2023-03-24 ENCOUNTER — APPOINTMENT (OUTPATIENT)
Dept: PHYSICAL THERAPY | Age: 23
End: 2023-03-24
Payer: COMMERCIAL

## 2023-03-30 ENCOUNTER — HOSPITAL ENCOUNTER (OUTPATIENT)
Dept: PHYSICAL THERAPY | Age: 23
End: 2023-03-30
Payer: COMMERCIAL

## 2023-03-30 PROCEDURE — 97110 THERAPEUTIC EXERCISES: CPT | Performed by: PHYSICAL THERAPIST

## 2023-03-30 PROCEDURE — 97112 NEUROMUSCULAR REEDUCATION: CPT | Performed by: PHYSICAL THERAPIST

## 2023-03-30 NOTE — PROGRESS NOTES
PT DAILY TREATMENT NOTE     Patient Name: Sadia Meyer  Date:3/30/2023  : 2000  [x]  Patient  Verified  Payor: Khoa Rizvi / Plan: BSTITI VERA [facility] [address] / Product Type: PPO /    Treatment Area: Right ankle pain [M25.571]  Strain of right Achilles tendon, subsequent encounter [L93.011D]   Next MD APPT: no set return  In time: 152 pm Out time: 240 pm  Total Treatment Time (min): 48  Total Timed Codes (min): 38  Visit #: -    SUBJECTIVE    Any medication changes, allergies to medications, adverse drug reactions, diagnosis change, or new procedure performed?:   [x] No    [] Yes (see summary sheet for update)    Pain Level (0-10 scale) pre treatment: 2/10                      Pain Level (0-10 scale) post treatment: 0/10    Subjective functional status/changes:   [] No changes reported  Pt reports she has been doing well, but continues to get pain towards end of day and stiffness in the morning. Notes a lot of soreness in RLE following initiation of hopping previous visit. OBJECTIVE    28 min Therapeutic Exercise:  [x] See flow sheet :    Rationale: increase ROM, increase strength, improve coordination, and improve balance to improve the patients ability to perform functional tasks without pain in the (R) achilles. 10 min Neuromuscular Re-education:  [x]  See flow sheet : Balance,  hopping progression    Rationale: increase strength, improve coordination, improve balance, and increase proprioception  to improve the patients ability to meet functional goals   min Manual Therapy: scar mobs and MLD; k-tape to scar   Rationale: decrease pain, increase ROM, increase tissue extensibility, and decrease edema  to improve the patients ability to perform functional tasks without pain in the (R) achilles.      Modality rationale: decrease edema, decrease inflammation, and decrease pain to improve the patients ability to perform functional tasks with less swelling and pain     Min Type Additional Details      10 [x]  Ice     []  Heat  []  Ice massage Position: prone  Location: (R) achilles     [x] Skin assessment post-treatment:   [x]intact  []redness- no adverse reaction   []redness - adverse reaction:      With   [x] TE   [] TA   [] Neuro   [] SC   [] other: Patient Education: [] Review HEP    [] Progressed/Changed HEP based on:   [] positioning   [] body mechanics   [] transfers   [] Use of heat/ice    [] other:         Other Objective/Functional Measures: Added:  Box jumps        ASSESSMENT/Changes in Function:   Pt tolerated session well with no exacerbation of symptoms. Required cueing with box jumps for soft landings and to equally distribute weight between LEs. Patient will continue to benefit from skilled PT services to modify and progress therapeutic interventions, address functional mobility deficits, address ROM deficits, address strength deficits, and analyze and address soft tissue restrictions to attain remaining goals. GOALS/Progress towards goals:  Ampac: 18.72%  Patient Goal (s): walk normal again    [] Met [] Not met [x] Partially met 3/21 feels like she turns her (R) foot out when walking  Short Term Goals: To be accomplished in 4-6 treatments. Pt will demonstrate compliance of HEP with demonstration of exercises without cueing. [x] Met [] Not met [] Partially met   2/23  Pt will be able to demonstrate SLS for 30 sec on compliant surface with RLE. [x] Met [] Not met [] Partially met  2/23  Pt will have more normalized stride length and push of during ambulation. [x] Met [] Not met [] Partially met 3/16 can wear tennis shoes    Long Term Goals: To be accomplished in 12-16 treatments. Pt will be able to complete 8 hour work shift without increased pain. [] Met [] Not met [x] Partially met 3/16 less pain, no significant pain  Pt will be able to run for 1/4 mile without increased pain.   [] Met [x] Not met [] Partially met 3/16  Pt will be able to complete 10 single leg heel raises on R without increased pain. [x] Met [] Not met [] Partially met 3/7  Pt will be able to perform hopping on bilateral LE without increased pain.   [] Met [] Not met [x] Partially met 3/16 began mini hopping progression    PLAN  [x]  Continue plan of care  [x]  Upgrade activities as tolerated       [x]  Update interventions per flow sheet       []  Discharge due to:  []  Other:     Shalini Solano, PT 3/30/2023

## 2023-04-06 ENCOUNTER — APPOINTMENT (OUTPATIENT)
Dept: PHYSICAL THERAPY | Age: 23
End: 2023-04-06
Payer: COMMERCIAL

## 2023-04-07 ENCOUNTER — APPOINTMENT (OUTPATIENT)
Dept: PHYSICAL THERAPY | Age: 23
End: 2023-04-07
Payer: COMMERCIAL

## 2023-04-24 ENCOUNTER — HOSPITAL ENCOUNTER (OUTPATIENT)
Dept: PHYSICAL THERAPY | Age: 23
Discharge: HOME | End: 2023-04-24
Payer: COMMERCIAL

## 2023-04-24 PROCEDURE — 97110 THERAPEUTIC EXERCISES: CPT | Performed by: PHYSICAL THERAPIST

## 2023-05-10 ENCOUNTER — APPOINTMENT (OUTPATIENT)
Dept: PHYSICAL THERAPY | Age: 23
End: 2023-05-10

## 2023-09-26 DIAGNOSIS — O36.80X0 ENCOUNTER TO DETERMINE FETAL VIABILITY OF PREGNANCY, SINGLE OR UNSPECIFIED FETUS: Primary | ICD-10-CM

## 2023-10-02 ENCOUNTER — TELEPHONE (OUTPATIENT)
Age: 23
End: 2023-10-02

## 2023-10-03 ENCOUNTER — INITIAL PRENATAL (OUTPATIENT)
Age: 23
End: 2023-10-03

## 2023-10-03 VITALS — WEIGHT: 135.4 LBS | SYSTOLIC BLOOD PRESSURE: 122 MMHG | BODY MASS INDEX: 23.99 KG/M2 | DIASTOLIC BLOOD PRESSURE: 82 MMHG

## 2023-10-03 DIAGNOSIS — Z34.82 PRENATAL CARE, SUBSEQUENT PREGNANCY, SECOND TRIMESTER: Primary | ICD-10-CM

## 2023-10-03 PROCEDURE — 0500F INITIAL PRENATAL CARE VISIT: CPT | Performed by: OBSTETRICS & GYNECOLOGY

## 2023-10-03 RX ORDER — PRENATAL VIT/IRON FUM/FOLIC AC 27MG-0.8MG
TABLET ORAL
COMMUNITY
Start: 2023-09-17

## 2023-10-27 DIAGNOSIS — B37.31 VAGINAL YEAST INFECTION: Primary | ICD-10-CM

## 2023-11-02 ENCOUNTER — ROUTINE PRENATAL (OUTPATIENT)
Age: 23
End: 2023-11-02

## 2023-11-02 VITALS — BODY MASS INDEX: 24.8 KG/M2 | SYSTOLIC BLOOD PRESSURE: 138 MMHG | DIASTOLIC BLOOD PRESSURE: 75 MMHG | WEIGHT: 140 LBS

## 2023-11-02 DIAGNOSIS — Z34.82 PRENATAL CARE, SUBSEQUENT PREGNANCY, SECOND TRIMESTER: Primary | ICD-10-CM

## 2023-11-30 ENCOUNTER — ROUTINE PRENATAL (OUTPATIENT)
Age: 23
End: 2023-11-30

## 2023-11-30 VITALS
WEIGHT: 145.25 LBS | RESPIRATION RATE: 16 BRPM | SYSTOLIC BLOOD PRESSURE: 123 MMHG | BODY MASS INDEX: 25.73 KG/M2 | DIASTOLIC BLOOD PRESSURE: 76 MMHG | HEIGHT: 63 IN | HEART RATE: 72 BPM | OXYGEN SATURATION: 100 %

## 2023-11-30 DIAGNOSIS — Z34.82 PRENATAL CARE, SUBSEQUENT PREGNANCY, SECOND TRIMESTER: Primary | ICD-10-CM

## 2023-11-30 PROCEDURE — 0502F SUBSEQUENT PRENATAL CARE: CPT | Performed by: OBSTETRICS & GYNECOLOGY

## 2024-01-03 ENCOUNTER — ROUTINE PRENATAL (OUTPATIENT)
Age: 24
End: 2024-01-03

## 2024-01-03 VITALS — BODY MASS INDEX: 26.39 KG/M2 | WEIGHT: 149 LBS | SYSTOLIC BLOOD PRESSURE: 119 MMHG | DIASTOLIC BLOOD PRESSURE: 74 MMHG

## 2024-01-03 DIAGNOSIS — Z34.83 PRENATAL CARE, SUBSEQUENT PREGNANCY, THIRD TRIMESTER: Primary | ICD-10-CM

## 2024-01-03 PROCEDURE — 0502F SUBSEQUENT PRENATAL CARE: CPT | Performed by: OBSTETRICS & GYNECOLOGY

## 2024-01-23 ENCOUNTER — ROUTINE PRENATAL (OUTPATIENT)
Age: 24
End: 2024-01-23

## 2024-01-23 VITALS
WEIGHT: 152.25 LBS | BODY MASS INDEX: 26.98 KG/M2 | SYSTOLIC BLOOD PRESSURE: 110 MMHG | HEIGHT: 63 IN | DIASTOLIC BLOOD PRESSURE: 68 MMHG

## 2024-01-23 DIAGNOSIS — Z34.83 PRENATAL CARE, SUBSEQUENT PREGNANCY, THIRD TRIMESTER: Primary | ICD-10-CM

## 2024-01-23 DIAGNOSIS — Z3A.32 32 WEEKS GESTATION OF PREGNANCY: ICD-10-CM

## 2024-01-23 PROCEDURE — 0502F SUBSEQUENT PRENATAL CARE: CPT | Performed by: OBSTETRICS & GYNECOLOGY

## 2024-01-23 NOTE — PROGRESS NOTES
No current complaints. No PN labs on file. Will have drawn today. A record release was sent x2 & still no record received.

## 2024-01-24 LAB
ABO GROUP BLD: NORMAL
BLD GP AB SCN SERPL QL: NEGATIVE
HBV SURFACE AG SERPL QL IA: NEGATIVE
HCV AB SERPL QL IA: NORMAL
HCV IGG SERPL QL IA: NON REACTIVE
HIV 1+2 AB+HIV1 P24 AG SERPL QL IA: NON REACTIVE
RH BLD: POSITIVE
RPR SER QL: NON REACTIVE
RUBV IGG SERPL IA-ACNC: 1.57 INDEX

## 2024-01-25 LAB
BASOPHILS # BLD AUTO: 0 X10E3/UL (ref 0–0.2)
BASOPHILS NFR BLD AUTO: 0 %
EOSINOPHIL # BLD AUTO: 0 X10E3/UL (ref 0–0.4)
EOSINOPHIL NFR BLD AUTO: 0 %
ERYTHROCYTE [DISTWIDTH] IN BLOOD BY AUTOMATED COUNT: 12.9 % (ref 11.7–15.4)
HCT VFR BLD AUTO: 38.4 % (ref 34–46.6)
HGB A MFR BLD ELPH: 62.6 % (ref 96.4–98.8)
HGB A2 MFR BLD ELPH: 3 % (ref 1.8–3.2)
HGB BLD-MCNC: 13 G/DL (ref 11.1–15.9)
HGB F MFR BLD ELPH: 0 % (ref 0–2)
HGB FRACT BLD-IMP: ABNORMAL
HGB FRACT BLD-IMP: ABNORMAL
HGB S BLD QL SOLY: POSITIVE
HGB S MFR BLD ELPH: 34.4 %
IMM GRANULOCYTES # BLD AUTO: 0 X10E3/UL (ref 0–0.1)
IMM GRANULOCYTES NFR BLD AUTO: 0 %
LYMPHOCYTES # BLD AUTO: 1.4 X10E3/UL (ref 0.7–3.1)
LYMPHOCYTES NFR BLD AUTO: 17 %
MCH RBC QN AUTO: 27.4 PG (ref 26.6–33)
MCHC RBC AUTO-ENTMCNC: 33.9 G/DL (ref 31.5–35.7)
MCV RBC AUTO: 81 FL (ref 79–97)
MONOCYTES # BLD AUTO: 0.4 X10E3/UL (ref 0.1–0.9)
MONOCYTES NFR BLD AUTO: 5 %
MORPHOLOGY BLD-IMP: ABNORMAL
NEUTROPHILS # BLD AUTO: 6.1 X10E3/UL (ref 1.4–7)
NEUTROPHILS NFR BLD AUTO: 78 %
PLATELET # BLD AUTO: 98 X10E3/UL (ref 150–450)
RBC # BLD AUTO: 4.74 X10E6/UL (ref 3.77–5.28)
WBC # BLD AUTO: 7.9 X10E3/UL (ref 3.4–10.8)

## 2024-01-30 ENCOUNTER — TELEPHONE (OUTPATIENT)
Age: 24
End: 2024-01-30

## 2024-01-30 NOTE — TELEPHONE ENCOUNTER
Patient called regarding her platelet count being low and what she needs to do.  Advised her per Dr Dan note, she needs to repeat her CBC at her 35 week visit on 02/06/24.

## 2024-02-06 ENCOUNTER — ROUTINE PRENATAL (OUTPATIENT)
Age: 24
End: 2024-02-06

## 2024-02-06 VITALS
HEIGHT: 63 IN | WEIGHT: 152.25 LBS | DIASTOLIC BLOOD PRESSURE: 68 MMHG | SYSTOLIC BLOOD PRESSURE: 112 MMHG | BODY MASS INDEX: 26.98 KG/M2

## 2024-02-06 DIAGNOSIS — Z3A.34 34 WEEKS GESTATION OF PREGNANCY: ICD-10-CM

## 2024-02-06 DIAGNOSIS — D69.6 THROMBOCYTOPENIA AFFECTING PREGNANCY (HCC): Primary | ICD-10-CM

## 2024-02-06 DIAGNOSIS — O99.119 THROMBOCYTOPENIA AFFECTING PREGNANCY (HCC): Primary | ICD-10-CM

## 2024-02-06 PROCEDURE — 0502F SUBSEQUENT PRENATAL CARE: CPT | Performed by: OBSTETRICS & GYNECOLOGY

## 2024-02-06 NOTE — PROGRESS NOTES
Questions addressed  Gestational thrombocytopenia DWP  Per pt had GBS on Urine culture at previous MD's office

## 2024-02-08 ENCOUNTER — TELEPHONE (OUTPATIENT)
Age: 24
End: 2024-02-08

## 2024-02-08 LAB
BASOPHILS # BLD AUTO: 0 X10E3/UL (ref 0–0.2)
BASOPHILS NFR BLD AUTO: 0 %
EOSINOPHIL # BLD AUTO: 0.1 X10E3/UL (ref 0–0.4)
EOSINOPHIL NFR BLD AUTO: 1 %
ERYTHROCYTE [DISTWIDTH] IN BLOOD BY AUTOMATED COUNT: 13.8 % (ref 11.7–15.4)
HCT VFR BLD AUTO: 40.1 % (ref 34–46.6)
HGB BLD-MCNC: 13.2 G/DL (ref 11.1–15.9)
IMM GRANULOCYTES # BLD AUTO: 0 X10E3/UL (ref 0–0.1)
IMM GRANULOCYTES NFR BLD AUTO: 1 %
LYMPHOCYTES # BLD AUTO: 1.5 X10E3/UL (ref 0.7–3.1)
LYMPHOCYTES NFR BLD AUTO: 18 %
MCH RBC QN AUTO: 27.3 PG (ref 26.6–33)
MCHC RBC AUTO-ENTMCNC: 32.9 G/DL (ref 31.5–35.7)
MCV RBC AUTO: 83 FL (ref 79–97)
MONOCYTES # BLD AUTO: 0.5 X10E3/UL (ref 0.1–0.9)
MONOCYTES NFR BLD AUTO: 6 %
MORPHOLOGY BLD-IMP: ABNORMAL
NEUTROPHILS # BLD AUTO: 6.3 X10E3/UL (ref 1.4–7)
NEUTROPHILS NFR BLD AUTO: 74 %
PLATELET # BLD AUTO: 98 X10E3/UL (ref 150–450)
RBC # BLD AUTO: 4.83 X10E6/UL (ref 3.77–5.28)
WBC # BLD AUTO: 8.5 X10E3/UL (ref 3.4–10.8)

## 2024-02-08 NOTE — TELEPHONE ENCOUNTER
Patient called questioning if there is a vitamin or vitamins that she can take to help increase her platelet.  States at the previous practice she was being seen at she was told to take things like Vitamin B12 and others that she can't remember the name of.  Message forwarded to Dr Dan.

## 2024-02-13 ENCOUNTER — ROUTINE PRENATAL (OUTPATIENT)
Age: 24
End: 2024-02-13

## 2024-02-13 VITALS
BODY MASS INDEX: 26.29 KG/M2 | WEIGHT: 148.38 LBS | HEIGHT: 63 IN | DIASTOLIC BLOOD PRESSURE: 72 MMHG | SYSTOLIC BLOOD PRESSURE: 110 MMHG

## 2024-02-13 DIAGNOSIS — O99.119 THROMBOCYTOPENIA AFFECTING PREGNANCY (HCC): ICD-10-CM

## 2024-02-13 DIAGNOSIS — Z36.85 ANTENATAL SCREENING FOR STREPTOCOCCUS B: ICD-10-CM

## 2024-02-13 DIAGNOSIS — Z11.3 SCREENING EXAMINATION FOR VENEREAL DISEASE: ICD-10-CM

## 2024-02-13 DIAGNOSIS — Z34.83 PRENATAL CARE, SUBSEQUENT PREGNANCY, THIRD TRIMESTER: Primary | ICD-10-CM

## 2024-02-13 DIAGNOSIS — D69.6 THROMBOCYTOPENIA AFFECTING PREGNANCY (HCC): ICD-10-CM

## 2024-02-13 PROCEDURE — 0502F SUBSEQUENT PRENATAL CARE: CPT | Performed by: OBSTETRICS & GYNECOLOGY

## 2024-02-13 NOTE — PROGRESS NOTES
GBS/chlamydia/gc/cbc done today. No current complaints. [facility]

## 2024-02-15 ENCOUNTER — HOSPITAL ENCOUNTER (EMERGENCY)
Facility: HOSPITAL | Age: 24
Discharge: LEFT BEFORE DISCHARGE | End: 2024-02-15
Attending: STUDENT IN AN ORGANIZED HEALTH CARE EDUCATION/TRAINING PROGRAM
Payer: COMMERCIAL

## 2024-02-15 VITALS
BODY MASS INDEX: 26.22 KG/M2 | OXYGEN SATURATION: 100 % | TEMPERATURE: 98.8 F | SYSTOLIC BLOOD PRESSURE: 115 MMHG | RESPIRATION RATE: 18 BRPM | HEART RATE: 81 BPM | WEIGHT: 148 LBS | DIASTOLIC BLOOD PRESSURE: 75 MMHG | HEIGHT: 63 IN

## 2024-02-15 DIAGNOSIS — R10.30 LOWER ABDOMINAL PAIN: Primary | ICD-10-CM

## 2024-02-15 DIAGNOSIS — O26.93 COMPLICATION OF PREGNANCY IN THIRD TRIMESTER: ICD-10-CM

## 2024-02-15 LAB
BASOPHILS # BLD AUTO: 0 X10E3/UL (ref 0–0.2)
BASOPHILS NFR BLD AUTO: 1 %
EOSINOPHIL # BLD AUTO: 0 X10E3/UL (ref 0–0.4)
EOSINOPHIL NFR BLD AUTO: 1 %
ERYTHROCYTE [DISTWIDTH] IN BLOOD BY AUTOMATED COUNT: 13.4 % (ref 11.7–15.4)
GP B STREP DNA SPEC QL NAA+PROBE: NEGATIVE
HCT VFR BLD AUTO: 40.2 % (ref 34–46.6)
HGB BLD-MCNC: 13.3 G/DL (ref 11.1–15.9)
IMM GRANULOCYTES # BLD AUTO: 0 X10E3/UL (ref 0–0.1)
IMM GRANULOCYTES NFR BLD AUTO: 0 %
LYMPHOCYTES # BLD AUTO: 1.8 X10E3/UL (ref 0.7–3.1)
LYMPHOCYTES NFR BLD AUTO: 27 %
MCH RBC QN AUTO: 27.2 PG (ref 26.6–33)
MCHC RBC AUTO-ENTMCNC: 33.1 G/DL (ref 31.5–35.7)
MCV RBC AUTO: 82 FL (ref 79–97)
MONOCYTES # BLD AUTO: 0.3 X10E3/UL (ref 0.1–0.9)
MONOCYTES NFR BLD AUTO: 5 %
MORPHOLOGY BLD-IMP: ABNORMAL
NEUTROPHILS # BLD AUTO: 4.4 X10E3/UL (ref 1.4–7)
NEUTROPHILS NFR BLD AUTO: 66 %
PLATELET # BLD AUTO: 99 X10E3/UL (ref 150–450)
RBC # BLD AUTO: 4.89 X10E6/UL (ref 3.77–5.28)
WBC # BLD AUTO: 6.6 X10E3/UL (ref 3.4–10.8)

## 2024-02-15 PROCEDURE — 99282 EMERGENCY DEPT VISIT SF MDM: CPT

## 2024-02-15 NOTE — ED NOTES
Called and spoke with Olga in L&D at [facility], who reported patient has not yet arrived to L&D.

## 2024-02-15 NOTE — ED NOTES
SB[facility] report called to [facility].  Dr. Leach accepted pt.  Will notify L&D when pt leaves [facility].

## 2024-02-15 NOTE — ED NOTES
Spoke with Olga in L&D at [facility], who reported pt has still not arrived.  Will go ahead and dispo pt from the system.

## 2024-02-15 NOTE — ED PROVIDER NOTES
[facility] EMERGENCY DEPT  EMERGENCY DEPARTMENT HISTORY AND PHYSICAL EXAM      Date: 2/15/2024  Patient Name: Lisa John  MRN: [id number]  YOB: 2000  Date of evaluation: 2/15/2024  Provider: Clint Munguia MD   Note Started: 4:31 PM EST 2/15/24    HISTORY OF PRESENT ILLNESS     Chief Complaint   Patient presents with    Abdominal Pain    Pregnancy Problem       History Provided By: Patient    HPI: Lisa John is a 23 y.o. female  approximately 36 weeks pregnant presents the emergency department for evaluation of abdominal discomfort.  Patient is earlier today she started noticing some lower abdominal pain, feels like she has to have a bowel movement.  States that symptoms have increased over the course of the day, denies any vaginal discharge, no bleeding.  No nausea or vomiting.    PAST MEDICAL HISTORY   Past Medical History:  Past Medical History:   Diagnosis Date    Gestational thrombocytopenia (HCC) 2024    Sickle cell trait in mother affecting pregnancy (HCC)        Past Surgical History:  History reviewed. No pertinent surgical history.    Family History:  Family History   Family history unknown: Yes       Social History:  Social History     Tobacco Use    Smoking status: Never    Smokeless tobacco: Never   Vaping Use    Vaping Use: Never used   Substance Use Topics    Alcohol use: Never    Drug use: Never     Types: Marijuana (Weed)       Allergies:  No Known Allergies    PCP: None, None    Current Meds:   No current facility-administered medications for this encounter.     Current Outpatient Medications   Medication Sig Dispense Refill    Prenatal Vit-Fe Fumarate-FA (PRENATAL VITAMIN) 27-0.8 MG TABS TAKE 1 TABLET BY MOUTH ONCE DAILY FOR 30 DAYS         Social Determinants of Health:   Social Determinants of Health     Tobacco Use: Low Risk  (2/15/2024)    Patient History     Smoking Tobacco Use: Never     Smokeless Tobacco Use: Never     Passive Exposure: Not on file   Alcohol Use: Not

## 2024-02-15 NOTE — ED TRIAGE NOTES
Pt c/o intermittent lower pelvic pain, approx 5 minutes apart.  Pt 36w2d, 1st pregnancy.  Denies any fluids leaking.

## 2024-02-16 ENCOUNTER — CARE COORDINATION (OUTPATIENT)
Dept: OTHER | Facility: CLINIC | Age: 24
End: 2024-02-16

## 2024-02-17 LAB
C TRACH RRNA SPEC QL NAA+PROBE: NEGATIVE
N GONORRHOEA RRNA SPEC QL NAA+PROBE: NEGATIVE

## 2024-02-19 ENCOUNTER — CARE COORDINATION (OUTPATIENT)
Dept: OTHER | Facility: CLINIC | Age: 24
End: 2024-02-19

## 2024-02-19 NOTE — CARE COORDINATION
Assessment: 36w came in for abd pains contractions 2/15 gasper 3/15/24  Healthy behavior review  Fetal movement-yes  Red flags: bleeding/leaking  Labor signs and symptoms-none pt still closed   Birth planning: delivery at [facility], formula feeding pt is on [facility]  Maternity Classes? No      Patient given an opportunity to ask questions and does not have any further questions or concerns at this time. Were discharge instructions available to patient? Yes. Reviewed appropriate site of care based on symptoms and resources available to patient including: Benefits related nurse triage line  When to call 911  Condition related references. The patient agrees to contact the OB/Gyn office for questions related to their healthcare.    Plan for follow-up call in 10-14 days based on severity of symptoms and risk factors.  Plan for next call: self management-prenatal care/      Closed  2/21/24  [facility]  [facility]fm  Glucose testing normal  Boy  Delivery  Feeding similac   On wic  Partime time still working  Car seat   Fob  Lives with mom

## 2024-02-21 ENCOUNTER — ROUTINE PRENATAL (OUTPATIENT)
Age: 24
End: 2024-02-21

## 2024-02-21 VITALS
SYSTOLIC BLOOD PRESSURE: 112 MMHG | HEIGHT: 66 IN | BODY MASS INDEX: 23.69 KG/M2 | DIASTOLIC BLOOD PRESSURE: 72 MMHG | WEIGHT: 147.38 LBS

## 2024-02-21 DIAGNOSIS — O99.119 THROMBOCYTOPENIA AFFECTING PREGNANCY (HCC): Primary | ICD-10-CM

## 2024-02-21 DIAGNOSIS — Z3A.37 37 WEEKS GESTATION OF PREGNANCY: ICD-10-CM

## 2024-02-21 DIAGNOSIS — D69.6 THROMBOCYTOPENIA AFFECTING PREGNANCY (HCC): Primary | ICD-10-CM

## 2024-02-21 PROCEDURE — 0502F SUBSEQUENT PRENATAL CARE: CPT | Performed by: OBSTETRICS & GYNECOLOGY

## 2024-02-22 LAB
BASOPHILS # BLD AUTO: 0 X10E3/UL (ref 0–0.2)
BASOPHILS NFR BLD AUTO: 0 %
EOSINOPHIL # BLD AUTO: 0 X10E3/UL (ref 0–0.4)
EOSINOPHIL NFR BLD AUTO: 0 %
ERYTHROCYTE [DISTWIDTH] IN BLOOD BY AUTOMATED COUNT: 13.4 % (ref 11.7–15.4)
HCT VFR BLD AUTO: 40.3 % (ref 34–46.6)
HGB BLD-MCNC: 13.4 G/DL (ref 11.1–15.9)
IMM GRANULOCYTES # BLD AUTO: 0.1 X10E3/UL (ref 0–0.1)
IMM GRANULOCYTES NFR BLD AUTO: 1 %
LYMPHOCYTES # BLD AUTO: 1.4 X10E3/UL (ref 0.7–3.1)
LYMPHOCYTES NFR BLD AUTO: 16 %
MCH RBC QN AUTO: 27 PG (ref 26.6–33)
MCHC RBC AUTO-ENTMCNC: 33.3 G/DL (ref 31.5–35.7)
MCV RBC AUTO: 81 FL (ref 79–97)
MONOCYTES # BLD AUTO: 0.6 X10E3/UL (ref 0.1–0.9)
MONOCYTES NFR BLD AUTO: 7 %
MORPHOLOGY BLD-IMP: ABNORMAL
NEUTROPHILS # BLD AUTO: 6.5 X10E3/UL (ref 1.4–7)
NEUTROPHILS NFR BLD AUTO: 76 %
PLATELET # BLD AUTO: 102 X10E3/UL (ref 150–450)
RBC # BLD AUTO: 4.96 X10E6/UL (ref 3.77–5.28)
WBC # BLD AUTO: 8.6 X10E3/UL (ref 3.4–10.8)

## 2024-03-01 ENCOUNTER — ROUTINE PRENATAL (OUTPATIENT)
Age: 24
End: 2024-03-01

## 2024-03-01 VITALS
DIASTOLIC BLOOD PRESSURE: 70 MMHG | BODY MASS INDEX: 24.93 KG/M2 | WEIGHT: 155.13 LBS | HEIGHT: 66 IN | SYSTOLIC BLOOD PRESSURE: 110 MMHG

## 2024-03-01 DIAGNOSIS — O99.113 BENIGN GESTATIONAL THROMBOCYTOPENIA IN THIRD TRIMESTER (HCC): Primary | ICD-10-CM

## 2024-03-01 DIAGNOSIS — D69.6 BENIGN GESTATIONAL THROMBOCYTOPENIA IN THIRD TRIMESTER (HCC): Primary | ICD-10-CM

## 2024-03-01 DIAGNOSIS — Z3A.38 38 WEEKS GESTATION OF PREGNANCY: ICD-10-CM

## 2024-03-01 RX ORDER — METHYLPREDNISOLONE 4 MG/1
TABLET ORAL
Qty: 21 TABLET | Refills: 0 | Status: SHIPPED | OUTPATIENT
Start: 2024-03-01 | End: 2024-03-07

## 2024-03-01 NOTE — PROGRESS NOTES
Questions addressed  Repeat Plts today  Medrol dosepak sent- will await results and then instruct as to starting

## 2024-03-03 LAB
BASOPHILS # BLD AUTO: 0 X10E3/UL (ref 0–0.2)
BASOPHILS NFR BLD AUTO: 0 %
EOSINOPHIL # BLD AUTO: 0 X10E3/UL (ref 0–0.4)
EOSINOPHIL NFR BLD AUTO: 0 %
ERYTHROCYTE [DISTWIDTH] IN BLOOD BY AUTOMATED COUNT: 13.6 % (ref 11.7–15.4)
HCT VFR BLD AUTO: 39.2 % (ref 34–46.6)
HGB BLD-MCNC: 12.6 G/DL (ref 11.1–15.9)
IMM GRANULOCYTES # BLD AUTO: 0 X10E3/UL (ref 0–0.1)
IMM GRANULOCYTES NFR BLD AUTO: 1 %
LYMPHOCYTES # BLD AUTO: 1.4 X10E3/UL (ref 0.7–3.1)
LYMPHOCYTES NFR BLD AUTO: 17 %
MCH RBC QN AUTO: 26.8 PG (ref 26.6–33)
MCHC RBC AUTO-ENTMCNC: 32.1 G/DL (ref 31.5–35.7)
MCV RBC AUTO: 83 FL (ref 79–97)
MONOCYTES # BLD AUTO: 0.5 X10E3/UL (ref 0.1–0.9)
MONOCYTES NFR BLD AUTO: 7 %
MORPHOLOGY BLD-IMP: ABNORMAL
NEUTROPHILS # BLD AUTO: 6.3 X10E3/UL (ref 1.4–7)
NEUTROPHILS NFR BLD AUTO: 75 %
PLATELET # BLD AUTO: 92 X10E3/UL (ref 150–450)
RBC # BLD AUTO: 4.71 X10E6/UL (ref 3.77–5.28)
WBC # BLD AUTO: 8.3 X10E3/UL (ref 3.4–10.8)

## 2024-03-07 ENCOUNTER — ROUTINE PRENATAL (OUTPATIENT)
Age: 24
End: 2024-03-07

## 2024-03-07 VITALS
DIASTOLIC BLOOD PRESSURE: 72 MMHG | SYSTOLIC BLOOD PRESSURE: 126 MMHG | BODY MASS INDEX: 27.22 KG/M2 | WEIGHT: 169.38 LBS | HEIGHT: 66 IN

## 2024-03-07 DIAGNOSIS — O99.119 THROMBOCYTOPENIA AFFECTING PREGNANCY (HCC): Primary | ICD-10-CM

## 2024-03-07 DIAGNOSIS — D69.6 THROMBOCYTOPENIA AFFECTING PREGNANCY (HCC): Primary | ICD-10-CM

## 2024-03-07 DIAGNOSIS — Z3A.39 39 WEEKS GESTATION OF PREGNANCY: ICD-10-CM

## 2024-03-07 PROCEDURE — 0502F SUBSEQUENT PRENATAL CARE: CPT | Performed by: OBSTETRICS & GYNECOLOGY

## 2024-03-07 NOTE — PROGRESS NOTES
Questions addressed  PDF- IOL 3/15/24- risks benefits and alternatives DWP including   Medrol dosepak this week for gestational Thrombocytopenia

## 2024-03-08 ENCOUNTER — HOSPITAL ENCOUNTER (INPATIENT)
Facility: HOSPITAL | Age: 24
LOS: 2 days | Discharge: HOME | End: 2024-03-10
Attending: OBSTETRICS & GYNECOLOGY | Admitting: OBSTETRICS & GYNECOLOGY
Payer: COMMERCIAL

## 2024-03-08 ENCOUNTER — ANESTHESIA (OUTPATIENT)
Facility: HOSPITAL | Age: 24
End: 2024-03-08
Payer: COMMERCIAL

## 2024-03-08 ENCOUNTER — ANESTHESIA EVENT (OUTPATIENT)
Facility: HOSPITAL | Age: 24
End: 2024-03-08
Payer: COMMERCIAL

## 2024-03-08 DIAGNOSIS — G89.18 POSTOPERATIVE PAIN: Primary | ICD-10-CM

## 2024-03-08 PROBLEM — Z3A.39 39 WEEKS GESTATION OF PREGNANCY: Status: ACTIVE | Noted: 2024-03-08

## 2024-03-08 LAB
ABO + RH BLD: NORMAL
AMNIOTEST, POC: NORMAL
AMPHET UR QL SCN: NEGATIVE
BARBITURATES UR QL SCN: NEGATIVE
BASOPHILS # BLD: 0 K/UL (ref 0–0.1)
BASOPHILS NFR BLD: 0 % (ref 0–1)
BENZODIAZ UR QL: NEGATIVE
BLOOD GROUP ANTIBODIES SERPL: NEGATIVE
CANNABINOIDS UR QL SCN: NEGATIVE
COCAINE UR QL SCN: NEGATIVE
DIFFERENTIAL METHOD BLD: ABNORMAL
EOSINOPHIL # BLD: 0 K/UL (ref 0–0.4)
EOSINOPHIL NFR BLD: 0 % (ref 0–7)
ERYTHROCYTE [DISTWIDTH] IN BLOOD BY AUTOMATED COUNT: 13.4 % (ref 11.5–14.5)
HCT VFR BLD AUTO: 38.2 % (ref 35–47)
HGB BLD-MCNC: 13.5 G/DL (ref 11.5–16)
IMM GRANULOCYTES # BLD AUTO: 0.1 K/UL (ref 0–0.04)
IMM GRANULOCYTES NFR BLD AUTO: 1 % (ref 0–0.5)
LDH SERPL L TO P-CCNC: 187 U/L (ref 81–246)
LYMPHOCYTES # BLD: 1.3 K/UL (ref 0.8–3.5)
LYMPHOCYTES NFR BLD: 13 % (ref 12–49)
Lab: NORMAL
Lab: NORMAL
MCH RBC QN AUTO: 27.4 PG (ref 26–34)
MCHC RBC AUTO-ENTMCNC: 35.3 G/DL (ref 30–36.5)
MCV RBC AUTO: 77.5 FL (ref 80–99)
METHADONE UR QL: NEGATIVE
MONOCYTES # BLD: 0.7 K/UL (ref 0–1)
MONOCYTES NFR BLD: 7 % (ref 5–13)
NEGATIVE QC PASS/FAIL: NORMAL
NEUTS SEG # BLD: 8 K/UL (ref 1.8–8)
NEUTS SEG NFR BLD: 79 % (ref 32–75)
NRBC # BLD: 0 K/UL (ref 0–0.01)
NRBC BLD-RTO: 0 PER 100 WBC
OPIATES UR QL: NEGATIVE
PCP UR QL: NEGATIVE
PLATELET # BLD AUTO: 95 K/UL (ref 150–400)
POSITIVE QC PASS/FAIL: NORMAL
RBC # BLD AUTO: 4.93 M/UL (ref 3.8–5.2)
SPECIMEN EXP DATE BLD: NORMAL
SURFACTANT/ALB AMN: POSITIVE
WBC # BLD AUTO: 10.2 K/UL (ref 3.6–11)

## 2024-03-08 PROCEDURE — 6360000002 HC RX W HCPCS: Performed by: OBSTETRICS & GYNECOLOGY

## 2024-03-08 PROCEDURE — 6360000002 HC RX W HCPCS

## 2024-03-08 PROCEDURE — 86592 SYPHILIS TEST NON-TREP QUAL: CPT

## 2024-03-08 PROCEDURE — 3700000025 EPIDURAL BLOCK: Performed by: ANESTHESIOLOGY

## 2024-03-08 PROCEDURE — 6360000002 HC RX W HCPCS: Performed by: NURSE ANESTHETIST, CERTIFIED REGISTERED

## 2024-03-08 PROCEDURE — 2500000003 HC RX 250 WO HCPCS: Performed by: NURSE ANESTHETIST, CERTIFIED REGISTERED

## 2024-03-08 PROCEDURE — 2580000003 HC RX 258: Performed by: OBSTETRICS & GYNECOLOGY

## 2024-03-08 PROCEDURE — 83615 LACTATE (LD) (LDH) ENZYME: CPT

## 2024-03-08 PROCEDURE — 1120000000 HC RM PRIVATE OB

## 2024-03-08 PROCEDURE — 86900 BLOOD TYPING SEROLOGIC ABO: CPT

## 2024-03-08 PROCEDURE — 85025 COMPLETE CBC W/AUTO DIFF WBC: CPT

## 2024-03-08 PROCEDURE — 00HU33Z INSERTION OF INFUSION DEVICE INTO SPINAL CANAL, PERCUTANEOUS APPROACH: ICD-10-PCS | Performed by: OBSTETRICS & GYNECOLOGY

## 2024-03-08 PROCEDURE — 84112 EVAL AMNIOTIC FLUID PROTEIN: CPT

## 2024-03-08 PROCEDURE — 86901 BLOOD TYPING SEROLOGIC RH(D): CPT

## 2024-03-08 PROCEDURE — 86850 RBC ANTIBODY SCREEN: CPT

## 2024-03-08 PROCEDURE — 4500000002 HC ER NO CHARGE

## 2024-03-08 PROCEDURE — 80307 DRUG TEST PRSMV CHEM ANLYZR: CPT

## 2024-03-08 RX ORDER — FENTANYL/BUPIVACAINE/NS/PF 2-1250MCG
10 PLASTIC BAG, INJECTION (ML) INJECTION CONTINUOUS
Status: DISCONTINUED | OUTPATIENT
Start: 2024-03-08 | End: 2024-03-09

## 2024-03-08 RX ORDER — FENTANYL CITRATE 50 UG/ML
INJECTION, SOLUTION INTRAMUSCULAR; INTRAVENOUS PRN
Status: DISCONTINUED | OUTPATIENT
Start: 2024-03-08 | End: 2024-03-08 | Stop reason: SDUPTHER

## 2024-03-08 RX ORDER — NALOXONE HYDROCHLORIDE 0.4 MG/ML
INJECTION, SOLUTION INTRAMUSCULAR; INTRAVENOUS; SUBCUTANEOUS PRN
Status: DISCONTINUED | OUTPATIENT
Start: 2024-03-08 | End: 2024-03-09

## 2024-03-08 RX ORDER — FENTANYL CITRATE 50 UG/ML
INJECTION, SOLUTION INTRAMUSCULAR; INTRAVENOUS
Status: COMPLETED
Start: 2024-03-08 | End: 2024-03-08

## 2024-03-08 RX ORDER — LABETALOL HYDROCHLORIDE 5 MG/ML
20 INJECTION, SOLUTION INTRAVENOUS
Status: COMPLETED | OUTPATIENT
Start: 2024-03-08 | End: 2024-03-09

## 2024-03-08 RX ORDER — ONDANSETRON 2 MG/ML
4 INJECTION INTRAMUSCULAR; INTRAVENOUS EVERY 6 HOURS PRN
Status: DISCONTINUED | OUTPATIENT
Start: 2024-03-08 | End: 2024-03-09

## 2024-03-08 RX ORDER — BUPIVACAINE HYDROCHLORIDE 2.5 MG/ML
INJECTION, SOLUTION EPIDURAL; INFILTRATION; INTRACAUDAL
Status: COMPLETED
Start: 2024-03-08 | End: 2024-03-08

## 2024-03-08 RX ORDER — FENTANYL CITRATE 50 UG/ML
INJECTION, SOLUTION INTRAMUSCULAR; INTRAVENOUS
Status: DISCONTINUED
Start: 2024-03-08 | End: 2024-03-09

## 2024-03-08 RX ORDER — SODIUM CHLORIDE, SODIUM LACTATE, POTASSIUM CHLORIDE, AND CALCIUM CHLORIDE .6; .31; .03; .02 G/100ML; G/100ML; G/100ML; G/100ML
500 INJECTION, SOLUTION INTRAVENOUS PRN
Status: DISCONTINUED | OUTPATIENT
Start: 2024-03-08 | End: 2024-03-09

## 2024-03-08 RX ORDER — ONDANSETRON 4 MG/1
4 TABLET, ORALLY DISINTEGRATING ORAL EVERY 6 HOURS PRN
Status: DISCONTINUED | OUTPATIENT
Start: 2024-03-08 | End: 2024-03-09

## 2024-03-08 RX ORDER — LABETALOL HYDROCHLORIDE 5 MG/ML
40 INJECTION, SOLUTION INTRAVENOUS
Status: ACTIVE | OUTPATIENT
Start: 2024-03-08 | End: 2024-03-09

## 2024-03-08 RX ORDER — MISOPROSTOL 200 UG/1
400 TABLET ORAL PRN
Status: DISCONTINUED | OUTPATIENT
Start: 2024-03-08 | End: 2024-03-09

## 2024-03-08 RX ORDER — SODIUM CHLORIDE, SODIUM LACTATE, POTASSIUM CHLORIDE, AND CALCIUM CHLORIDE .6; .31; .03; .02 G/100ML; G/100ML; G/100ML; G/100ML
1000 INJECTION, SOLUTION INTRAVENOUS PRN
Status: DISCONTINUED | OUTPATIENT
Start: 2024-03-08 | End: 2024-03-09

## 2024-03-08 RX ORDER — HYDRALAZINE HYDROCHLORIDE 20 MG/ML
5 INJECTION INTRAMUSCULAR; INTRAVENOUS
Status: COMPLETED | OUTPATIENT
Start: 2024-03-08 | End: 2024-03-08

## 2024-03-08 RX ORDER — LIDOCAINE HYDROCHLORIDE 10 MG/ML
INJECTION, SOLUTION INFILTRATION; PERINEURAL
Status: DISCONTINUED
Start: 2024-03-08 | End: 2024-03-09

## 2024-03-08 RX ORDER — CARBOPROST TROMETHAMINE 250 UG/ML
250 INJECTION, SOLUTION INTRAMUSCULAR PRN
Status: DISCONTINUED | OUTPATIENT
Start: 2024-03-08 | End: 2024-03-09

## 2024-03-08 RX ORDER — TERBUTALINE SULFATE 1 MG/ML
0.25 INJECTION, SOLUTION SUBCUTANEOUS
Status: DISCONTINUED | OUTPATIENT
Start: 2024-03-08 | End: 2024-03-09

## 2024-03-08 RX ORDER — HYDRALAZINE HYDROCHLORIDE 20 MG/ML
10 INJECTION INTRAMUSCULAR; INTRAVENOUS
Status: ACTIVE | OUTPATIENT
Start: 2024-03-08 | End: 2024-03-09

## 2024-03-08 RX ORDER — SODIUM CHLORIDE, SODIUM LACTATE, POTASSIUM CHLORIDE, CALCIUM CHLORIDE 600; 310; 30; 20 MG/100ML; MG/100ML; MG/100ML; MG/100ML
INJECTION, SOLUTION INTRAVENOUS CONTINUOUS
Status: DISCONTINUED | OUTPATIENT
Start: 2024-03-08 | End: 2024-03-09

## 2024-03-08 RX ORDER — METHYLERGONOVINE MALEATE 0.2 MG/ML
200 INJECTION INTRAVENOUS PRN
Status: DISCONTINUED | OUTPATIENT
Start: 2024-03-08 | End: 2024-03-09

## 2024-03-08 RX ORDER — PENICILLIN G 3000000 [IU]/50ML
3 INJECTION, SOLUTION INTRAVENOUS EVERY 4 HOURS
Status: DISCONTINUED | OUTPATIENT
Start: 2024-03-08 | End: 2024-03-09

## 2024-03-08 RX ORDER — ACETAMINOPHEN 325 MG/1
650 TABLET ORAL EVERY 4 HOURS PRN
Status: DISCONTINUED | OUTPATIENT
Start: 2024-03-08 | End: 2024-03-09

## 2024-03-08 RX ORDER — BUPIVACAINE HYDROCHLORIDE 2.5 MG/ML
INJECTION, SOLUTION EPIDURAL; INFILTRATION; INTRACAUDAL PRN
Status: DISCONTINUED | OUTPATIENT
Start: 2024-03-08 | End: 2024-03-08 | Stop reason: SDUPTHER

## 2024-03-08 RX ADMIN — ONDANSETRON 4 MG: 2 INJECTION INTRAMUSCULAR; INTRAVENOUS at 18:16

## 2024-03-08 RX ADMIN — SODIUM CHLORIDE 5 MILLION UNITS: 900 INJECTION INTRAVENOUS at 14:58

## 2024-03-08 RX ADMIN — BUPIVACAINE HYDROCHLORIDE 5 ML: 2.5 INJECTION, SOLUTION EPIDURAL; INFILTRATION; INTRACAUDAL; PERINEURAL at 18:13

## 2024-03-08 RX ADMIN — SODIUM CHLORIDE, POTASSIUM CHLORIDE, SODIUM LACTATE AND CALCIUM CHLORIDE 1000 ML: 600; 310; 30; 20 INJECTION, SOLUTION INTRAVENOUS at 14:57

## 2024-03-08 RX ADMIN — LIDOCAINE HYDROCHLORIDE 5 ML: 20 INJECTION, SOLUTION EPIDURAL; INFILTRATION; INTRACAUDAL; PERINEURAL at 18:01

## 2024-03-08 RX ADMIN — BUPIVACAINE HYDROCHLORIDE 3 ML: 2.5 INJECTION, SOLUTION EPIDURAL; INFILTRATION; INTRACAUDAL; PERINEURAL at 18:19

## 2024-03-08 RX ADMIN — FENTANYL CITRATE 100 MCG: 50 INJECTION, SOLUTION INTRAMUSCULAR; INTRAVENOUS at 18:09

## 2024-03-08 RX ADMIN — SODIUM CHLORIDE, POTASSIUM CHLORIDE, SODIUM LACTATE AND CALCIUM CHLORIDE: 600; 310; 30; 20 INJECTION, SOLUTION INTRAVENOUS at 18:34

## 2024-03-08 RX ADMIN — PENICILLIN G 3 MILLION UNITS: 3000000 INJECTION, SOLUTION INTRAVENOUS at 19:35

## 2024-03-08 RX ADMIN — Medication 2 MILLI-UNITS/MIN: at 15:04

## 2024-03-08 RX ADMIN — HYDRALAZINE HYDROCHLORIDE 5 MG: 20 INJECTION INTRAMUSCULAR; INTRAVENOUS at 17:11

## 2024-03-08 RX ADMIN — FENTANYL CITRATE 100 MCG: 50 INJECTION INTRAMUSCULAR; INTRAVENOUS at 16:54

## 2024-03-08 RX ADMIN — Medication 10 ML/HR: at 18:31

## 2024-03-08 ASSESSMENT — PAIN DESCRIPTION - LOCATION: LOCATION: ABDOMEN;BACK

## 2024-03-08 ASSESSMENT — LIFESTYLE VARIABLES
HOW OFTEN DO YOU HAVE A DRINK CONTAINING ALCOHOL: NEVER
HOW MANY STANDARD DRINKS CONTAINING ALCOHOL DO YOU HAVE ON A TYPICAL DAY: PATIENT DOES NOT DRINK

## 2024-03-08 ASSESSMENT — PAIN - FUNCTIONAL ASSESSMENT: PAIN_FUNCTIONAL_ASSESSMENT: NONE - DENIES PAIN

## 2024-03-08 ASSESSMENT — PAIN SCALES - GENERAL
PAINLEVEL_OUTOF10: 7
PAINLEVEL_OUTOF10: 0

## 2024-03-08 ASSESSMENT — PAIN DESCRIPTION - DESCRIPTORS: DESCRIPTORS: CRAMPING

## 2024-03-08 ASSESSMENT — PAIN DESCRIPTION - ORIENTATION: ORIENTATION: LOWER

## 2024-03-08 NOTE — H&P
History & Physical    Name: Lisa John MRN: [id number]  SSN: [id number]    YOB: 2000  Age: 23 y.o.  Sex: female        Subjective:     Estimated Date of Delivery: 3/13/24  OB History    Para Term  AB Living   2       1     SAB IAB Ectopic Molar Multiple Live Births     1              # Outcome Date GA Lbr Armando/2nd Weight Sex Delivery Anes PTL Lv   2 Current            1 IAB                Ms. John is admitted with pregnancy at 39w2d for Presumptive ROM . Prenatal course was complicated by  maternal thrombocytopenia . Please see prenatal records for details.    Past Medical History:   Diagnosis Date    Gestational thrombocytopenia (HCC) 2024    Sickle cell trait in mother affecting pregnancy (HCC)      Past Surgical History:   Procedure Laterality Date    TONSILLECTOMY Bilateral     age 6    WISDOM TOOTH EXTRACTION Bilateral     age 16     Social History     Occupational History    Not on file   Tobacco Use    Smoking status: Never    Smokeless tobacco: Never   Vaping Use    Vaping Use: Never used   Substance and Sexual Activity    Alcohol use: Never    Drug use: Never     Types: Marijuana (Weed)    Sexual activity: Yes     Partners: Male     Birth control/protection: None     Family History   Family history unknown: Yes       No Known Allergies  Prior to Admission medications    Medication Sig Start Date End Date Taking? Authorizing Provider   Prenatal Vit-Fe Fumarate-FA (PRENATAL VITAMIN) 27-0.8 MG TABS TAKE 1 TABLET BY MOUTH ONCE DAILY FOR 30 DAYS 23   Provider, Leisa, MD        Review of Systems: Pertinent items are noted in HPI.    Objective:     Vitals:  Vitals:    24 1259 24 1311   BP:  (!) 137/93   Pulse:  89   Resp:  17   Temp:  98.2 °F (36.8 °C)   TempSrc:  Oral   SpO2:  99%   Weight: 76.7 kg (169 lb) 72.6 kg (160 lb)   Height: 1.676 m (5' 6\") 1.676 m (5' 6\")        Physical Exam:  EXTERNAL GENITALIA: normal appearing vulva with no masses,

## 2024-03-08 NOTE — ANESTHESIA PROCEDURE NOTES
Epidural Block    Patient location during procedure: OB  Start time: 3/8/2024 5:50 PM  End time: 3/8/2024 6:10 PM  Reason for block: labor epidural  Staffing  Performed: resident/CRNA   Resident/CRNA: Guillermo Rodriguez APRN - CRNA  Performed by: Guillermo Rodriguez APRN - CRNA  Authorized by: Fransico Cherry Jr., MD    Epidural  Patient position: sitting  Prep: Betasept  Patient monitoring: cardiac monitor, continuous pulse ox and frequent blood pressure checks  Approach: midline  Location: L4-5  Injection technique: CARLOTA air  Provider prep: mask and sterile gloves  Needle  Needle type: Tuohy   Needle gauge: 17 G  Needle length: 6 in  Needle insertion depth: 4 cm  Catheter type: multi-orifice  Catheter size: 19 G  Catheter at skin depth: 11 cm  Test dose: negativeCatheter Secured: tegaderm and tape  Assessment  Sensory level: T10  Hemodynamics: stable  Attempts: 1  Outcomes: uncomplicated and patient tolerated procedure well  Preanesthetic Checklist  Completed: patient identified, IV checked, site marked, risks and benefits discussed, surgical/procedural consents, equipment checked, pre-op evaluation, timeout performed, anesthesia consent given, oxygen available, monitors applied/VS acknowledged and fire risk safety assessment completed and verbalized

## 2024-03-08 NOTE — ANESTHESIA PRE PROCEDURE
Department of Anesthesiology  Preprocedure Note       Name:  Lisa John   Age:  23 y.o.  :  2000                                          MRN:  [id number]         Date:  3/8/2024      Surgeon: * No surgeons listed *    Procedure: * No procedures listed *    Medications prior to admission:   Prior to Admission medications    Medication Sig Start Date End Date Taking? Authorizing Provider   Prenatal Vit-Fe Fumarate-FA (PRENATAL VITAMIN) 27-0.8 MG TABS TAKE 1 TABLET BY MOUTH ONCE DAILY FOR 30 DAYS 23   Provider, MD Leisa       Current medications:    Current Facility-Administered Medications   Medication Dose Route Frequency Provider Last Rate Last Admin    terbutaline (BRETHINE) injection 0.25 mg  0.25 mg SubCUTAneous Once PRN Gracy Treadwell MD        lactated ringers IV soln infusion   IntraVENous Continuous Gracy Treadwell MD        lactated ringers bolus 500 mL  500 mL IntraVENous PRN Gracy Treadwell MD        Or    lactated ringers bolus 1,000 mL  1,000 mL IntraVENous PRN Gracy Treadwell .9 mL/hr at 24 1457 1,000 mL at 24 1457    ondansetron (ZOFRAN) injection 4 mg  4 mg IntraVENous Q6H PRN Gracy Treadwell MD        Or    ondansetron (ZOFRAN-ODT) disintegrating tablet 4 mg  4 mg Oral Q6H PRN Gracy Treadwell MD        oxytocin (PITOCIN) 30 units in 500 mL infusion  87.3 linda-units/min IntraVENous Continuous PRN Gracy Treadwell MD        And    oxytocin (PITOCIN) 10 unit bolus from the bag  10 Units IntraVENous PRN Gracy Treadwell MD        methylergonovine (METHERGINE) injection 200 mcg  200 mcg IntraMUSCular PRN Gracy Treadwell MD        carboprost (HEMABATE) injection 250 mcg  250 mcg IntraMUSCular PRN Gracy Treadwell MD        miSOPROStol (CYTOTEC) tablet 400 mcg  400 mcg Buccal PRN Gracy Treadwell MD        tranexamic acid (CYKLOKAPRON) 1,000 mg in sodium chloride 0.9 %

## 2024-03-09 LAB
ALBUMIN SERPL-MCNC: 2.8 G/DL (ref 3.5–5)
ALBUMIN/GLOB SERPL: 0.9 (ref 1.1–2.2)
ALP SERPL-CCNC: 158 U/L (ref 45–117)
ALT SERPL-CCNC: 16 U/L (ref 12–78)
ANION GAP SERPL CALC-SCNC: 8 MMOL/L (ref 5–15)
AST SERPL W P-5'-P-CCNC: 17 U/L (ref 15–37)
BASOPHILS # BLD AUTO: 0 X10E3/UL (ref 0–0.2)
BASOPHILS NFR BLD AUTO: 0 %
BILIRUB SERPL-MCNC: 1 MG/DL (ref 0.2–1)
BUN SERPL-MCNC: 6 MG/DL (ref 6–20)
BUN/CREAT SERPL: 10 (ref 12–20)
CA-I BLD-MCNC: 8.8 MG/DL (ref 8.5–10.1)
CHLORIDE SERPL-SCNC: 110 MMOL/L (ref 97–108)
CO2 SERPL-SCNC: 21 MMOL/L (ref 21–32)
CREAT SERPL-MCNC: 0.62 MG/DL (ref 0.55–1.02)
CREAT UR-MCNC: <13 MG/DL
EOSINOPHIL # BLD AUTO: 0 X10E3/UL (ref 0–0.4)
EOSINOPHIL NFR BLD AUTO: 0 %
ERYTHROCYTE [DISTWIDTH] IN BLOOD BY AUTOMATED COUNT: 13.6 % (ref 11.5–14.5)
ERYTHROCYTE [DISTWIDTH] IN BLOOD BY AUTOMATED COUNT: 13.8 % (ref 11.7–15.4)
GLOBULIN SER CALC-MCNC: 3.1 G/DL (ref 2–4)
GLUCOSE SERPL-MCNC: 97 MG/DL (ref 65–100)
HCT VFR BLD AUTO: 38 % (ref 35–47)
HCT VFR BLD AUTO: 39.7 % (ref 34–46.6)
HGB BLD-MCNC: 13.5 G/DL (ref 11.1–15.9)
HGB BLD-MCNC: 13.6 G/DL (ref 11.5–16)
IMM GRANULOCYTES # BLD AUTO: 0.1 X10E3/UL (ref 0–0.1)
IMM GRANULOCYTES NFR BLD AUTO: 1 %
LYMPHOCYTES # BLD AUTO: 1.8 X10E3/UL (ref 0.7–3.1)
LYMPHOCYTES NFR BLD AUTO: 20 %
MCH RBC QN AUTO: 27.4 PG (ref 26.6–33)
MCH RBC QN AUTO: 27.5 PG (ref 26–34)
MCHC RBC AUTO-ENTMCNC: 34 G/DL (ref 31.5–35.7)
MCHC RBC AUTO-ENTMCNC: 35.8 G/DL (ref 30–36.5)
MCV RBC AUTO: 76.8 FL (ref 80–99)
MCV RBC AUTO: 81 FL (ref 79–97)
MONOCYTES # BLD AUTO: 0.7 X10E3/UL (ref 0.1–0.9)
MONOCYTES NFR BLD AUTO: 8 %
MORPHOLOGY BLD-IMP: ABNORMAL
NEUTROPHILS # BLD AUTO: 6.4 X10E3/UL (ref 1.4–7)
NEUTROPHILS NFR BLD AUTO: 71 %
NRBC # BLD: 0 K/UL (ref 0–0.01)
NRBC BLD-RTO: 0 PER 100 WBC
PLATELET # BLD AUTO: 95 X10E3/UL (ref 150–450)
PLATELET # BLD AUTO: 96 K/UL (ref 150–400)
POTASSIUM SERPL-SCNC: 3.5 MMOL/L (ref 3.5–5.1)
PROT SERPL-MCNC: 5.9 G/DL (ref 6.4–8.2)
PROT UR-MCNC: 8 MG/DL (ref 0–11.9)
PROT/CREAT UR-RTO: NORMAL
RBC # BLD AUTO: 4.93 X10E6/UL (ref 3.77–5.28)
RBC # BLD AUTO: 4.95 M/UL (ref 3.8–5.2)
SODIUM SERPL-SCNC: 139 MMOL/L (ref 136–145)
WBC # BLD AUTO: 17.2 K/UL (ref 3.6–11)
WBC # BLD AUTO: 9 X10E3/UL (ref 3.4–10.8)

## 2024-03-09 PROCEDURE — 7100000001 HC PACU RECOVERY - ADDTL 15 MIN

## 2024-03-09 PROCEDURE — 4A1HXCZ MONITORING OF PRODUCTS OF CONCEPTION, CARDIAC RATE, EXTERNAL APPROACH: ICD-10-PCS | Performed by: OBSTETRICS & GYNECOLOGY

## 2024-03-09 PROCEDURE — 0UQMXZZ REPAIR VULVA, EXTERNAL APPROACH: ICD-10-PCS | Performed by: OBSTETRICS & GYNECOLOGY

## 2024-03-09 PROCEDURE — 36415 COLL VENOUS BLD VENIPUNCTURE: CPT

## 2024-03-09 PROCEDURE — 80053 COMPREHEN METABOLIC PANEL: CPT

## 2024-03-09 PROCEDURE — 7220000101 HC DELIVERY VAGINAL/SINGLE

## 2024-03-09 PROCEDURE — 84156 ASSAY OF PROTEIN URINE: CPT

## 2024-03-09 PROCEDURE — 99213 OFFICE O/P EST LOW 20 MIN: CPT

## 2024-03-09 PROCEDURE — 6370000000 HC RX 637 (ALT 250 FOR IP): Performed by: OBSTETRICS & GYNECOLOGY

## 2024-03-09 PROCEDURE — 6360000002 HC RX W HCPCS: Performed by: OBSTETRICS & GYNECOLOGY

## 2024-03-09 PROCEDURE — 1120000000 HC RM PRIVATE OB

## 2024-03-09 PROCEDURE — 7210000100 HC LABOR FEE PER 1 HR

## 2024-03-09 PROCEDURE — 3700000156 HC EPIDURAL ANESTHESIA

## 2024-03-09 PROCEDURE — 85027 COMPLETE CBC AUTOMATED: CPT

## 2024-03-09 PROCEDURE — 0HQ9XZZ REPAIR PERINEUM SKIN, EXTERNAL APPROACH: ICD-10-PCS | Performed by: OBSTETRICS & GYNECOLOGY

## 2024-03-09 PROCEDURE — 7100000000 HC PACU RECOVERY - FIRST 15 MIN

## 2024-03-09 PROCEDURE — 82570 ASSAY OF URINE CREATININE: CPT

## 2024-03-09 RX ORDER — SODIUM CHLORIDE, SODIUM LACTATE, POTASSIUM CHLORIDE, AND CALCIUM CHLORIDE .6; .31; .03; .02 G/100ML; G/100ML; G/100ML; G/100ML
1000 INJECTION, SOLUTION INTRAVENOUS PRN
Status: DISCONTINUED | OUTPATIENT
Start: 2024-03-09 | End: 2024-03-09

## 2024-03-09 RX ORDER — OXYCODONE HYDROCHLORIDE 5 MG/1
5 TABLET ORAL EVERY 4 HOURS PRN
Status: DISCONTINUED | OUTPATIENT
Start: 2024-03-09 | End: 2024-03-10 | Stop reason: HOSPADM

## 2024-03-09 RX ORDER — ONDANSETRON 2 MG/ML
4 INJECTION INTRAMUSCULAR; INTRAVENOUS EVERY 6 HOURS PRN
Status: DISCONTINUED | OUTPATIENT
Start: 2024-03-09 | End: 2024-03-09

## 2024-03-09 RX ORDER — ACETAMINOPHEN 325 MG/1
650 TABLET ORAL EVERY 4 HOURS PRN
Status: DISCONTINUED | OUTPATIENT
Start: 2024-03-09 | End: 2024-03-09

## 2024-03-09 RX ORDER — SODIUM CHLORIDE 0.9 % (FLUSH) 0.9 %
5-40 SYRINGE (ML) INJECTION EVERY 12 HOURS SCHEDULED
Status: DISCONTINUED | OUTPATIENT
Start: 2024-03-09 | End: 2024-03-09

## 2024-03-09 RX ORDER — OXYCODONE HYDROCHLORIDE 5 MG/1
10 TABLET ORAL EVERY 4 HOURS PRN
Status: DISCONTINUED | OUTPATIENT
Start: 2024-03-09 | End: 2024-03-10 | Stop reason: HOSPADM

## 2024-03-09 RX ORDER — SODIUM CHLORIDE, SODIUM LACTATE, POTASSIUM CHLORIDE, CALCIUM CHLORIDE 600; 310; 30; 20 MG/100ML; MG/100ML; MG/100ML; MG/100ML
INJECTION, SOLUTION INTRAVENOUS CONTINUOUS
Status: DISCONTINUED | OUTPATIENT
Start: 2024-03-09 | End: 2024-03-09

## 2024-03-09 RX ORDER — IBUPROFEN 400 MG/1
400 TABLET ORAL EVERY 6 HOURS PRN
Status: DISCONTINUED | OUTPATIENT
Start: 2024-03-09 | End: 2024-03-10 | Stop reason: HOSPADM

## 2024-03-09 RX ORDER — ONDANSETRON 4 MG/1
4 TABLET, ORALLY DISINTEGRATING ORAL EVERY 6 HOURS PRN
Status: DISCONTINUED | OUTPATIENT
Start: 2024-03-09 | End: 2024-03-09

## 2024-03-09 RX ORDER — SODIUM CHLORIDE 9 MG/ML
25 INJECTION, SOLUTION INTRAVENOUS PRN
Status: DISCONTINUED | OUTPATIENT
Start: 2024-03-09 | End: 2024-03-09

## 2024-03-09 RX ORDER — SODIUM CHLORIDE, SODIUM LACTATE, POTASSIUM CHLORIDE, AND CALCIUM CHLORIDE .6; .31; .03; .02 G/100ML; G/100ML; G/100ML; G/100ML
500 INJECTION, SOLUTION INTRAVENOUS PRN
Status: DISCONTINUED | OUTPATIENT
Start: 2024-03-09 | End: 2024-03-09

## 2024-03-09 RX ORDER — SODIUM CHLORIDE 0.9 % (FLUSH) 0.9 %
5-40 SYRINGE (ML) INJECTION PRN
Status: DISCONTINUED | OUTPATIENT
Start: 2024-03-09 | End: 2024-03-09

## 2024-03-09 RX ORDER — CARBOPROST TROMETHAMINE 250 UG/ML
250 INJECTION, SOLUTION INTRAMUSCULAR PRN
Status: DISCONTINUED | OUTPATIENT
Start: 2024-03-09 | End: 2024-03-09

## 2024-03-09 RX ADMIN — ACETAMINOPHEN 650 MG: 325 TABLET ORAL at 04:38

## 2024-03-09 RX ADMIN — LABETALOL HYDROCHLORIDE 20 MG: 5 INJECTION, SOLUTION INTRAVENOUS at 04:10

## 2024-03-09 RX ADMIN — ACETAMINOPHEN 650 MG: 325 TABLET ORAL at 10:11

## 2024-03-09 RX ADMIN — IBUPROFEN 400 MG: 400 TABLET, FILM COATED ORAL at 18:25

## 2024-03-09 RX ADMIN — PENICILLIN G 3 MILLION UNITS: 3000000 INJECTION, SOLUTION INTRAVENOUS at 01:31

## 2024-03-09 ASSESSMENT — PAIN SCALES - GENERAL
PAINLEVEL_OUTOF10: 7
PAINLEVEL_OUTOF10: 7

## 2024-03-09 ASSESSMENT — PAIN DESCRIPTION - DESCRIPTORS
DESCRIPTORS: DISCOMFORT
DESCRIPTORS: DISCOMFORT

## 2024-03-09 ASSESSMENT — PAIN - FUNCTIONAL ASSESSMENT
PAIN_FUNCTIONAL_ASSESSMENT: ACTIVITIES ARE NOT PREVENTED
PAIN_FUNCTIONAL_ASSESSMENT: ACTIVITIES ARE NOT PREVENTED

## 2024-03-09 ASSESSMENT — PAIN DESCRIPTION - LOCATION
LOCATION: BACK
LOCATION: BACK

## 2024-03-10 VITALS
SYSTOLIC BLOOD PRESSURE: 125 MMHG | DIASTOLIC BLOOD PRESSURE: 92 MMHG | WEIGHT: 160 LBS | OXYGEN SATURATION: 100 % | TEMPERATURE: 98.1 F | HEIGHT: 66 IN | RESPIRATION RATE: 18 BRPM | HEART RATE: 76 BPM | BODY MASS INDEX: 25.71 KG/M2

## 2024-03-10 PROBLEM — G89.18 POSTOPERATIVE PAIN: Status: ACTIVE | Noted: 2024-03-10

## 2024-03-10 LAB — RPR SER QL: NONREACTIVE

## 2024-03-10 PROCEDURE — 99024 POSTOP FOLLOW-UP VISIT: CPT | Performed by: OBSTETRICS & GYNECOLOGY

## 2024-03-10 PROCEDURE — 6370000000 HC RX 637 (ALT 250 FOR IP): Performed by: OBSTETRICS & GYNECOLOGY

## 2024-03-10 RX ORDER — OXYCODONE HYDROCHLORIDE 5 MG/1
5 TABLET ORAL EVERY 4 HOURS PRN
Qty: 10 TABLET | Refills: 0 | Status: SHIPPED | OUTPATIENT
Start: 2024-03-10 | End: 2024-03-17

## 2024-03-10 RX ORDER — IBUPROFEN 400 MG/1
600 TABLET ORAL EVERY 6 HOURS PRN
Qty: 30 TABLET | Refills: 3 | Status: SHIPPED | OUTPATIENT
Start: 2024-03-10

## 2024-03-10 RX ADMIN — IBUPROFEN 400 MG: 400 TABLET, FILM COATED ORAL at 11:53

## 2024-03-10 ASSESSMENT — PAIN DESCRIPTION - DESCRIPTORS: DESCRIPTORS: CRAMPING

## 2024-03-10 ASSESSMENT — PAIN SCALES - GENERAL: PAINLEVEL_OUTOF10: 4

## 2024-03-10 ASSESSMENT — PAIN - FUNCTIONAL ASSESSMENT: PAIN_FUNCTIONAL_ASSESSMENT: ACTIVITIES ARE NOT PREVENTED

## 2024-03-10 ASSESSMENT — PAIN DESCRIPTION - ORIENTATION: ORIENTATION: LOWER

## 2024-03-10 ASSESSMENT — PAIN DESCRIPTION - LOCATION: LOCATION: ABDOMEN

## 2024-03-10 NOTE — PROGRESS NOTES
0020: Forebag ruptured spontaneously. Cervix 6/90/-1.   0400: Dr. Mtz notified for elevated blood pressure readings. Labetalol protocol and pre-eclampsia labs ordered.   0410: labetalol 20 mg given, sterile catheter utilized to obtain urine protein/creatinine ratio.     
1300: arrived to LD5 from ED. States 39 wks with EDC of 3/12/24 and pt of dr shepard. C/o \"my water broke\" at 1215. Reports continued leakage of clear fluid. Reports seeing some mucus when wiping. Denies vaginal bleeding. Denies feeling ucs. Reports positive fetal movement. To br to gown and provide urine sample    1311: into bed for application of efm monitors and vs. Sydney GONZALEZ RN into  to take over care of pt.  
1311-assumed care of pt. Pt resting in bed. Dr Lam to bedside. Pt condition and concerns reviewed with md. Plan of care reviewed with pt and family. Pt mother and cousin at bedside for support    1315-Nitrazine negative    1316-Rom plus obtained, VE 3cm. Pt last seen in office with dr shepard, was 3cm. Lots of yeast visible on md glove with ve    1356-pt laying on back. Repositioned to left tilt    1420-Pt has soaked through pad. Nitrazine positive. MD aware. Pt admitted. Legal Guardian reviewed with pt ans mother. Pt agrees that mother is her legal guardian.     1443-[facility]. Plan of care reviewed with pt and pt mother    1500-pt requesting epidural. Dr Cherry called    1545-Bps reviewed with md    1550-Dr Cherry called to desk. Anesthesia in route to unit    1555-Anesthesia at bedside, Isaac Andrews CRNA  1558-Consent reviewed and signed  1600-Timeout  1635-Epidural procedure stopped per pt request    1643-Bps reviewed with MD    1645--Dr Lam at bedside  Ve-unchanged. Plan of care reviewed with pt and family    Bps reviewed. Orders taken for hydralazine protocol     1740-ESEQUIEL Li called for epidural per pt request    1750-ESEQUIEL Li at bedside  1753-timeout  1802-Cath in  1803-Test  1807-bolus    1845-Pt has no complaints of pain at this time. Resting in bed. Family at bedside for support.     1850-Dr Lam and Dr Mtz at bedside for report    1915-report given to pm nurse  
1330- Discharged pt home in stable condition via wheelchair to private vehicle with her mother, baby in car seat.  
Labor Progress Note  Patient seen, fetal heart rate and contraction pattern evaluated, patient examined. CRNA attempted to place epidural however procedure not completed at patient and patient's parent request.      BP (!) 164/84   Pulse 80   Temp 98.2 °F (36.8 °C) (Oral)   Resp 17   Ht 1.676 m (5' 6\")   Wt 72.6 kg (160 lb)   SpO2 99%   BMI 25.82 kg/m²     Physical Exam:  Cervical Exam:  3 cm dilated    60% effaced    -2 station    Membranes:  Spontaneous Rupture of Membranes; Amniotic Fluid: clear fluid  Uterine Activity: Frequency: Every 2-4 minutes  Fetal Heart Rate: Reactive    Assessment/Plan:  Reassuring fetal status, Labor  Continue expectant management, Continue plan for vaginal delivery          
Labor Progress Note  Patient seen, fetal heart rate and contraction pattern evaluated.     Physical Exam:  Cervical Exam: 4-5 per RN Exam  Membranes:  Ruptured  Uterine Activity: Frequency: regular  Fetal Heart Rate: Reactive  Accelerations: yes  Decelerations: none  Variability- Moderate  Uterine contractions: regular    Assessment/Plan:  Reassuring fetal status. Will continue to observe overnight for labor.        Cameron Mtz MD          
This patient was 30 or more weeks gestation at the time of [facility] go-live.   For complete information pertaining to this patient's pregnancy, please refer to the paper chart and ACOG form.Delivery Note    Obstetrician:  Cameron Mtz MD    Assistant: none    Pre-Delivery Diagnosis: Term pregnancy    Post-Delivery Diagnosis: Male    Intrapartum Event: None    Procedure: Spontaneous vaginal delivery    Epidural: YES    Monitor:  Fetal Heart Tones - External and Uterine Contractions - External    Indications for instrumental delivery: none    Estimated Blood Loss: No data found    Episiotomy: n/a    Laceration(s):  1st degree and periurethral    Laceration(s) repair: YES, 3.0 Vicryl    Presentation: Cephalic    Fetal Description: [place]    Fetal Position: Occiput Anterior    Birth Weight: pending    Birth Length: pending    Apgar - One Minute: 8    Apgar - Five Minutes: 9    Umbilical Cord: 3 vessels present    Specimens: none           Complications:  none           Cord Blood Results:   Information for the patient's :  John, Baby Pending [facility] [[id number]]   No results found for: \"ABORH\", \"PCTDIG\", \"BILI\"   Prenatal Labs:     Lab Results   Component Value Date/Time    ABORH O Positive 2024 02:37 PM        Attending Attestation: I performed the procedure.    Signed By:  Cameron Mtz MD     2024            
MCH 27.4 26.0 - 34.0 PG    MCHC 35.3 30.0 - 36.5 g/dL    RDW 13.4 11.5 - 14.5 %    Platelets 95 (L) 150 - 400 K/uL    Nucleated RBCs 0.0 0.0  WBC    nRBC 0.00 0.00 - 0.01 K/uL    Neutrophils % 79 (H) 32 - 75 %    Lymphocytes % 13 12 - 49 %    Monocytes % 7 5 - 13 %    Eosinophils % 0 0 - 7 %    Basophils % 0 0 - 1 %    Immature Granulocytes 1 (H) 0 - 0.5 %    Neutrophils Absolute 8.0 1.8 - 8.0 K/UL    Lymphocytes Absolute 1.3 0.8 - 3.5 K/UL    Monocytes Absolute 0.7 0.0 - 1.0 K/UL    Eosinophils Absolute 0.0 0.0 - 0.4 K/UL    Basophils Absolute 0.0 0.0 - 0.1 K/UL    Absolute Immature Granulocyte 0.1 (H) 0.00 - 0.04 K/UL    Differential Type AUTOMATED     DRUG SCREEN MULTI URINE    Collection Time: 03/08/24  3:00 PM   Result Value Ref Range    Amphetamine, Urine Negative Negative      Barbiturates, Urine Negative Negative      Benzodiazepines, Urine Negative Negative      Cocaine, Urine Negative Negative      Methadone, Urine Negative Negative      Opiates, Urine Negative Negative      PCP, Urine Negative Negative      THC, TH-Cannabinol, Urine Negative Negative      Comments:        This test is a screen for drugs of abuse in a medical setting only (i.e., they are unconfirmed results and as such must not be used for non-medical purposes, e.g.,employment testing, legal testing). Due to its inherent nature, false positive (FP) and false negative (FN) results may be obtained. Therefore, if necessary for medical care, recommend confirmation of positive findings by GC/MS.         Assessment and Plan:      Principal Problem:    39 weeks gestation of pregnancy  Resolved Problems:    * No resolved hospital problems. *        SROM-  clear  TTP-plt 93k  GBS Pos- GBS Prophylaxis  Elevated BP- Hydralazine 5mg x 1 if persistent  continue  protocol and consider magnesiu sulfate  Signed By: Cameron Mtz MD     March 8, 2024                 
as tolerated  4. Discharge Medications: ibuprofen, percocet and medications prior to admission

## 2024-03-10 NOTE — PLAN OF CARE
home or other facility with appropriate resources  Outcome: Progressing     Problem: Chronic Conditions and Co-morbidities  Goal: Patient's chronic conditions and co-morbidity symptoms are monitored and maintained or improved  Outcome: Progressing

## 2024-03-10 NOTE — DISCHARGE SUMMARY
Name: Lisa John MRN: [id number]  SSN: [id number]    YOB: 2000  Age: 23 y.o.  Sex: female      Admit Date: 3/8/2024    Discharge Date: 3/10/2024     Admitting Physician: Gracy Treadwell MD     Attending Physician:  Gracy Treadwell,*     Admission Diagnoses: 39 weeks gestation of pregnancy [Z3A.39]    Discharge Diagnoses:   Information for the patient's :  Reid John [[id number]]   @[id number]@      Additional Diagnoses:  No components found for: \"PCTABR\", \"OBEXTABSCRN\", \"OBEXTRUBELLA\", \"OBEXTGRBS\"    Immunization(s):   There is no immunization history on file for this patient.     Hospital Course: Normal hospital course following the delivery.    Patient Instructions:   Current Discharge Medication List        START taking these medications    Details   oxyCODONE (ROXICODONE) 5 MG immediate release tablet Take 1 tablet by mouth every 4 hours as needed for Pain for up to 7 days. Max Daily Amount: 30 mg  Qty: 10 tablet, Refills: 0    Comments: Reduce doses taken as pain becomes manageable  Associated Diagnoses: Postoperative pain      ibuprofen (ADVIL;MOTRIN) 400 MG tablet Take 1.5 tablets by mouth every 6 hours as needed for Pain  Qty: 30 tablet, Refills: 3           CONTINUE these medications which have NOT CHANGED    Details   Prenatal Vit-Fe Fumarate-FA (PRENATAL VITAMIN) 27-0.8 MG TABS TAKE 1 TABLET BY MOUTH ONCE DAILY FOR 30 DAYS             Reference my discharge instructions.    No follow-ups on file.     Signed By:  Jose Stewart MD     March 10, 2024      \"[id number]\"     I spent 30 minutes or less with the patient to perform a final examination, discuss the hospital stay, give instructions for continuing care to all relevant caregivers and prepare discharge records, prescriptions and referral forms.

## 2024-03-11 ENCOUNTER — TELEPHONE (OUTPATIENT)
Age: 24
End: 2024-03-11

## 2024-03-11 ENCOUNTER — CARE COORDINATION (OUTPATIENT)
Dept: OTHER | Facility: CLINIC | Age: 24
End: 2024-03-11

## 2024-03-11 DIAGNOSIS — B37.9 YEAST INFECTION: Primary | ICD-10-CM

## 2024-03-11 RX ORDER — FLUCONAZOLE 150 MG/1
150 TABLET ORAL ONCE
Qty: 1 TABLET | Refills: 0 | Status: SHIPPED | OUTPATIENT
Start: 2024-03-11 | End: 2024-03-11

## 2024-03-11 NOTE — CARE COORDINATION
Call within 2 business days of discharge: Yes     Patient Current Location: [place]    Last Discharge Facility       Date Complaint Diagnosis Description Type Department Provider    3/8/24 Pregnancy Problem Postoperative pain ED to Hosp-Admission (Discharged) (SEND TO L&D) [id number] Gracy Treadwell MD     Maternity Care Manager contacted the patient by telephone to discuss the maternity management program.  Patient agrees to care management services at this time. Verified name and  with patient as identifiers.     Risk Factors Identified:  first baby       Needs to be reviewed by the provider   none         Method of communication with provider : none    Advance Care Planning:   Does patient have an Advance Directive:  not on file.     Does patient have OB/Gyn Selected? Yes    Discussed follow up appointments. If no appointment was previously scheduled, appointment scheduling offered: Yes  [facility] follow up appointment(s):   Future Appointments   Date Time Provider Department Center   2024 10:00 AM David Dan MD [facility]     Non-[facility] follow up appointment(s): n/a    OB History:   OB History    Para Term  AB Living   2 1 1   1 1   SAB IAB Ectopic Molar Multiple Live Births     1     0 1      # Outcome Date GA Lbr Armando/2nd Weight Sex Delivery Anes PTL Lv   2 Term 24 39w3d / 00:42 3.09 kg (6 lb 13 oz) M Vag-Spont EPI N JOSIAH   1 IAB                39w3d    Medication reconciliation was performed with patient, who verbalizes understanding of administration of home medications. Advised obtaining a 90-day supply of all daily and as-needed medications.     Barriers/Support system:  mother  Return to work planning? Yes      Postpartum Assessment: delivery 3/9/24  Vaginal  Lochia-normal  Fever No  BM? Yes   Decreased urinary output No  Perineal care-yes  Visual changes No  Calf pain No  Headache No  Swelling No  Breast Pain No  Depression or feelings of sadness or overwhelm-none

## 2024-03-11 NOTE — TELEPHONE ENCOUNTER
Patient stated that she was had a cervical exam before delivery by Dr. Lam and yeast was detected. Patient stated that she is now feeling symptoms. Per Dr. Lam, patient prescribed Diflucan.

## 2024-03-26 ENCOUNTER — CARE COORDINATION (OUTPATIENT)
Dept: OTHER | Facility: CLINIC | Age: 24
End: 2024-03-26

## 2024-03-26 NOTE — CARE COORDINATION
Call within 2 business days of discharge: Yes     Patient Current Location: [place]    Last Discharge Facility       Date Complaint Diagnosis Description Type Department Provider    3/8/24 Pregnancy Problem Postoperative pain ED to Hosp-Admission (Discharged) (SEND TO L&D) [id number] Gracy Treadwell MD            Maternity Care Manager contacted the patient by telephone to discuss the maternity management program.  Patient agrees to care management services at this time. Verified name and  with patient as identifiers.     Risk Factors Identified:  first baby      Needs to be reviewed by the provider   none         Method of communication with provider : none    Advance Care Planning:   Does patient have an Advance Directive:  not on file.     Does patient have OB/Gyn Selected? Yes    Discussed follow up appointments. If no appointment was previously scheduled, appointment scheduling offered: Yes  [facility] follow up appointment(s):   Future Appointments   Date Time Provider Department Center   [facility]24 10:00 AM [facility]     Non-[facility] follow up appointment(s): n/a    OB History:   OB History    Para Term  AB Living   2 1 1   1 1   SAB IAB Ectopic Molar Multiple Live Births     1     0 1      # Outcome Date GA Lbr Armando/2nd Weight Sex Delivery Anes PTL Lv   2 Term 24 39w3d / 00:42 3.09 kg (6 lb 13 oz) M Vag-Spont EPI N JOSIAH   1 IAB                39w3d    Postpartum Assessment: vag delivery 3/9/24. Pt is 2 weeks pp  Vaginal  Breast Feeding No  Sleep safety  Infant's weight      Patient given an opportunity to ask questions and does not have any further questions or concerns at this time. Reviewed appropriate site of care based on symptoms and resources available to patient including: Benefits related nurse triage line  When to call 911  Condition related references. The patient agrees to contact the OB/Gyn office for questions related to their healthcare.    Plan for

## 2024-04-09 ENCOUNTER — TELEPHONE (OUTPATIENT)
Age: 24
End: 2024-04-09

## 2024-04-09 NOTE — TELEPHONE ENCOUNTER
Patient stated that she delivered on 3/9 and had a laceration for which she still has stitches. She was not told if they would be dissolvable and when to come back to have them removed. Message forwarded to providers nurse.

## 2024-04-16 ENCOUNTER — POSTPARTUM VISIT (OUTPATIENT)
Age: 24
End: 2024-04-16

## 2024-04-16 VITALS
DIASTOLIC BLOOD PRESSURE: 72 MMHG | HEIGHT: 66 IN | SYSTOLIC BLOOD PRESSURE: 110 MMHG | BODY MASS INDEX: 23.16 KG/M2 | WEIGHT: 144.13 LBS

## 2024-04-16 DIAGNOSIS — Z12.4 PAP SMEAR FOR CERVICAL CANCER SCREENING: Primary | ICD-10-CM

## 2024-04-16 DIAGNOSIS — N94.89 SUPPRESSION OF MENSTRUATION: ICD-10-CM

## 2024-04-16 PROBLEM — G89.18 POSTOPERATIVE PAIN: Status: RESOLVED | Noted: 2024-03-10 | Resolved: 2024-04-16

## 2024-04-16 PROBLEM — Z3A.39 39 WEEKS GESTATION OF PREGNANCY: Status: RESOLVED | Noted: 2024-03-08 | Resolved: 2024-04-16

## 2024-04-16 PROCEDURE — 0503F POSTPARTUM CARE VISIT: CPT | Performed by: OBSTETRICS & GYNECOLOGY

## 2024-04-16 RX ORDER — MEDROXYPROGESTERONE ACETATE 150 MG/ML
150 INJECTION, SUSPENSION INTRAMUSCULAR
Qty: 1 ML | Refills: 3 | Status: SHIPPED | OUTPATIENT
Start: 2024-04-16

## 2024-04-16 ASSESSMENT — ENCOUNTER SYMPTOMS
GASTROINTESTINAL NEGATIVE: 1
RESPIRATORY NEGATIVE: 1

## 2024-04-16 NOTE — PROGRESS NOTES
Chief Complaint   Patient presents with    Postpartum Care     Wants depo provera. No cycle since delivery/not breast feeding     /72 (Site: Left Upper Arm, Position: Sitting, Cuff Size: Small Adult)   Ht 1.676 m (5' 6\")   Wt 65.4 kg (144 lb 2 oz)   Breastfeeding No   BMI 23.26 kg/m²     
normal and left adnexa normal.      Comments: Pap collected             Assessment: Lisa was seen today for postpartum care.    Diagnoses and all orders for this visit:    Pap smear for cervical cancer screening  -     PAP LB, Reflex HPV ASCUS ([id number]) ([facility])    Routine postpartum follow-up  -     PAP LB, Reflex HPV ASCUS ([id number]) ([facility])    Suppression of menstruation  -     medroxyPROGESTERone (DEPO-PROVERA) 150 MG/ML injection; Inject 1 mL into the muscle every 3 months    Bottle feeding    Plan: Questions addressed  Counseled re: diet, exercise, healthy lifestyle  Return for Annual  Rec annual mammogram

## 2024-04-23 ENCOUNTER — NURSE ONLY (OUTPATIENT)
Age: 24
End: 2024-04-23
Payer: COMMERCIAL

## 2024-04-23 VITALS
BODY MASS INDEX: 23.3 KG/M2 | RESPIRATION RATE: 18 BRPM | HEART RATE: 61 BPM | HEIGHT: 66 IN | SYSTOLIC BLOOD PRESSURE: 130 MMHG | WEIGHT: 145 LBS | DIASTOLIC BLOOD PRESSURE: 84 MMHG

## 2024-04-23 DIAGNOSIS — N91.2 AMENORRHEA: Primary | ICD-10-CM

## 2024-04-23 DIAGNOSIS — N94.89 SUPPRESSION OF MENSES: Primary | ICD-10-CM

## 2024-04-23 LAB
., LABCORP: NORMAL
CYTOLOGIST CVX/VAG CYTO: NORMAL
CYTOLOGY CVX/VAG DOC CYTO: NORMAL
CYTOLOGY CVX/VAG DOC THIN PREP: NORMAL
DX ICD CODE: NORMAL
Lab: NORMAL
OTHER STN SPEC: NORMAL
PATHOLOGIST CVX/VAG CYTO: NORMAL
STAT OF ADQ CVX/VAG CYTO-IMP: NORMAL

## 2024-04-23 PROCEDURE — 96372 THER/PROPH/DIAG INJ SC/IM: CPT | Performed by: OBSTETRICS & GYNECOLOGY

## 2024-04-23 RX ORDER — MEDROXYPROGESTERONE ACETATE 150 MG/ML
150 INJECTION, SUSPENSION INTRAMUSCULAR ONCE
Status: COMPLETED | OUTPATIENT
Start: 2024-04-23 | End: 2024-04-23

## 2024-04-23 RX ADMIN — MEDROXYPROGESTERONE ACETATE 150 MG: 150 INJECTION, SUSPENSION INTRAMUSCULAR at 10:42

## 2024-04-26 ENCOUNTER — CARE COORDINATION (OUTPATIENT)
Dept: OTHER | Facility: CLINIC | Age: 24
End: 2024-04-26

## 2024-05-09 ENCOUNTER — TELEPHONE (OUTPATIENT)
Age: 24
End: 2024-05-09

## 2024-05-09 NOTE — TELEPHONE ENCOUNTER
Called and lvm for pt to call office back to r/s her appt that is scheduled for 07/10/24 (depo shot), due to no Provider in the [facility] that day.ccm

## 2024-07-10 ENCOUNTER — NURSE ONLY (OUTPATIENT)
Age: 24
End: 2024-07-10
Payer: COMMERCIAL

## 2024-07-10 VITALS
DIASTOLIC BLOOD PRESSURE: 82 MMHG | RESPIRATION RATE: 16 BRPM | SYSTOLIC BLOOD PRESSURE: 140 MMHG | OXYGEN SATURATION: 99 % | HEART RATE: 61 BPM | BODY MASS INDEX: 25.21 KG/M2 | TEMPERATURE: 96.8 F | WEIGHT: 156.2 LBS

## 2024-07-10 DIAGNOSIS — N94.89 SUPPRESSION OF MENSES: Primary | ICD-10-CM

## 2024-07-10 PROCEDURE — 96372 THER/PROPH/DIAG INJ SC/IM: CPT | Performed by: OBSTETRICS & GYNECOLOGY

## 2024-07-10 RX ORDER — MEDROXYPROGESTERONE ACETATE 150 MG/ML
150 INJECTION, SUSPENSION INTRAMUSCULAR ONCE
Status: COMPLETED | OUTPATIENT
Start: 2024-07-10 | End: 2024-07-10

## 2024-07-10 RX ADMIN — MEDROXYPROGESTERONE ACETATE 150 MG: 150 INJECTION, SUSPENSION INTRAMUSCULAR at 10:37

## 2024-07-10 SDOH — ECONOMIC STABILITY: FOOD INSECURITY: WITHIN THE PAST 12 MONTHS, THE FOOD YOU BOUGHT JUST DIDN'T LAST AND YOU DIDN'T HAVE MONEY TO GET MORE.: NEVER TRUE

## 2024-07-10 SDOH — ECONOMIC STABILITY: INCOME INSECURITY: HOW HARD IS IT FOR YOU TO PAY FOR THE VERY BASICS LIKE FOOD, HOUSING, MEDICAL CARE, AND HEATING?: NOT HARD AT ALL

## 2024-07-10 SDOH — ECONOMIC STABILITY: FOOD INSECURITY: WITHIN THE PAST 12 MONTHS, YOU WORRIED THAT YOUR FOOD WOULD RUN OUT BEFORE YOU GOT MONEY TO BUY MORE.: NEVER TRUE

## 2024-07-10 SDOH — ECONOMIC STABILITY: HOUSING INSECURITY
IN THE LAST 12 MONTHS, WAS THERE A TIME WHEN YOU DID NOT HAVE A STEADY PLACE TO SLEEP OR SLEPT IN A SHELTER (INCLUDING NOW)?: NO

## 2024-07-10 ASSESSMENT — PATIENT HEALTH QUESTIONNAIRE - PHQ9
1. LITTLE INTEREST OR PLEASURE IN DOING THINGS: NOT AT ALL
SUM OF ALL RESPONSES TO PHQ QUESTIONS 1-9: 0
SUM OF ALL RESPONSES TO PHQ QUESTIONS 1-9: 0
2. FEELING DOWN, DEPRESSED OR HOPELESS: NOT AT ALL
SUM OF ALL RESPONSES TO PHQ QUESTIONS 1-9: 0
SUM OF ALL RESPONSES TO PHQ9 QUESTIONS 1 & 2: 0
SUM OF ALL RESPONSES TO PHQ QUESTIONS 1-9: 0

## 2024-07-10 NOTE — PROGRESS NOTES
Chief Complaint   Patient presents with    Injections       1. \"Have you been to the ER, urgent care clinic since your last visit?  Hospitalized since your last visit?\" No    2. \"Have you seen or consulted any other health care providers outside of the [facility] since your last visit?\" No     3. For patients aged 45-75: Has the patient had a colonoscopy / FIT/ Cologuard? NA - based on age      If the patient is female:    4. For patients aged 40-74: Has the patient had a mammogram within the past 2 years? NA - based on age or sex      5. For patients aged 21-65: Has the patient had a pap smear? Yes - no Care Gap present

## 2024-07-16 DIAGNOSIS — R10.2 PELVIC PAIN: Primary | ICD-10-CM

## 2024-07-29 ENCOUNTER — TELEPHONE (OUTPATIENT)
Age: 24
End: 2024-07-29

## 2024-07-29 NOTE — TELEPHONE ENCOUNTER
Patient stated that she wanted to know what the next steps are to take care of her dermoid cyst since she is starting back to school in August. I informed her that she needed a follow-up appointment to which she already has but she wanted a sooner one to have surgery in August. I informed her that this would not be possible because all surgery dates for Dr. Dan were already booked until Sep/Oct. She stated someone else could do it and I responded that Dr. Dan is her provider and switch is not that simple. She stated she would call back.

## 2024-09-21 NOTE — ANESTHESIA POSTPROCEDURE EVALUATION
Department of Anesthesiology  Postprocedure Note    Patient: Lisa John  MRN: [id number]  YOB: 2000  Date of evaluation: 9/21/2024    Procedure Summary       Date: 03/08/24 Room / Location:     Anesthesia Start: 1555 Anesthesia Stop: 03/09/24 0242    Procedure: Labor Analgesia Diagnosis:     Scheduled Providers:  Responsible Provider: Fransico Cherry Jr., MD    Anesthesia Type: epidural ASA Status: 2            Anesthesia Type: No value filed.    Cynthia Phase I:      Cynthia Phase II:      Anesthesia Post Evaluation    Patient location during evaluation: floor  Patient participation: complete - patient participated  Level of consciousness: awake  Pain score: 0  Airway patency: patent  Nausea & Vomiting: no nausea and no vomiting  Cardiovascular status: hemodynamically stable  Respiratory status: acceptable  Hydration status: stable  Multimodal analgesia pain management approach        No notable events documented.

## 2024-09-25 ENCOUNTER — NURSE ONLY (OUTPATIENT)
Age: 24
End: 2024-09-25
Payer: COMMERCIAL

## 2024-09-25 VITALS
WEIGHT: 156.4 LBS | DIASTOLIC BLOOD PRESSURE: 81 MMHG | TEMPERATURE: 98.6 F | OXYGEN SATURATION: 100 % | HEART RATE: 68 BPM | RESPIRATION RATE: 18 BRPM | SYSTOLIC BLOOD PRESSURE: 133 MMHG | BODY MASS INDEX: 25.24 KG/M2

## 2024-09-25 DIAGNOSIS — N94.89 SUPPRESSION OF MENSES: Primary | ICD-10-CM

## 2024-09-25 PROCEDURE — 96372 THER/PROPH/DIAG INJ SC/IM: CPT | Performed by: OBSTETRICS & GYNECOLOGY

## 2024-09-25 RX ORDER — MEDROXYPROGESTERONE ACETATE 150 MG/ML
150 INJECTION, SUSPENSION INTRAMUSCULAR ONCE
Status: COMPLETED | OUTPATIENT
Start: 2024-09-25 | End: 2024-09-25

## 2024-09-25 RX ADMIN — MEDROXYPROGESTERONE ACETATE 150 MG: 150 INJECTION, SUSPENSION INTRAMUSCULAR at 13:32

## 2024-09-25 SDOH — ECONOMIC STABILITY: FOOD INSECURITY: WITHIN THE PAST 12 MONTHS, YOU WORRIED THAT YOUR FOOD WOULD RUN OUT BEFORE YOU GOT MONEY TO BUY MORE.: NEVER TRUE

## 2024-09-25 SDOH — ECONOMIC STABILITY: INCOME INSECURITY: HOW HARD IS IT FOR YOU TO PAY FOR THE VERY BASICS LIKE FOOD, HOUSING, MEDICAL CARE, AND HEATING?: NOT HARD AT ALL

## 2024-09-25 SDOH — ECONOMIC STABILITY: FOOD INSECURITY: WITHIN THE PAST 12 MONTHS, THE FOOD YOU BOUGHT JUST DIDN'T LAST AND YOU DIDN'T HAVE MONEY TO GET MORE.: NEVER TRUE

## 2024-09-25 ASSESSMENT — PATIENT HEALTH QUESTIONNAIRE - PHQ9
2. FEELING DOWN, DEPRESSED OR HOPELESS: NOT AT ALL
SUM OF ALL RESPONSES TO PHQ9 QUESTIONS 1 & 2: 0
SUM OF ALL RESPONSES TO PHQ QUESTIONS 1-9: 0
1. LITTLE INTEREST OR PLEASURE IN DOING THINGS: NOT AT ALL
SUM OF ALL RESPONSES TO PHQ QUESTIONS 1-9: 0

## 2024-12-16 ENCOUNTER — NURSE ONLY (OUTPATIENT)
Age: 24
End: 2024-12-16
Payer: COMMERCIAL

## 2024-12-16 VITALS
RESPIRATION RATE: 16 BRPM | HEIGHT: 62 IN | OXYGEN SATURATION: 99 % | BODY MASS INDEX: 29.63 KG/M2 | DIASTOLIC BLOOD PRESSURE: 80 MMHG | WEIGHT: 161 LBS | SYSTOLIC BLOOD PRESSURE: 142 MMHG | HEART RATE: 96 BPM | TEMPERATURE: 96.9 F

## 2024-12-16 DIAGNOSIS — N94.89 SUPPRESSION OF MENSES: Primary | ICD-10-CM

## 2024-12-16 PROCEDURE — 96372 THER/PROPH/DIAG INJ SC/IM: CPT | Performed by: OBSTETRICS & GYNECOLOGY

## 2024-12-16 RX ORDER — MEDROXYPROGESTERONE ACETATE 150 MG/ML
150 INJECTION, SUSPENSION INTRAMUSCULAR ONCE
Status: COMPLETED | OUTPATIENT
Start: 2024-12-16 | End: 2024-12-16

## 2024-12-16 RX ADMIN — MEDROXYPROGESTERONE ACETATE 150 MG: 150 INJECTION, SUSPENSION INTRAMUSCULAR at 10:40

## 2025-03-06 DIAGNOSIS — N94.89 SUPPRESSION OF MENSTRUATION: ICD-10-CM

## 2025-03-07 RX ORDER — MEDROXYPROGESTERONE ACETATE 150 MG/ML
INJECTION, SUSPENSION INTRAMUSCULAR
Qty: 1 ML | Refills: 0 | Status: SHIPPED | OUTPATIENT
Start: 2025-03-07

## 2025-04-10 ENCOUNTER — TELEPHONE (OUTPATIENT)
Age: 25
End: 2025-04-10

## 2025-04-10 NOTE — TELEPHONE ENCOUNTER
Patient called stating she would like to switch from Depo Provera to a pill due to weight gain.  She was due to get her injection by 03/17/25.  States she has not been sexually active as her boyfriend in away in the .  She also reports she has not had her cycle yet.  Advised her her message would be forwarded to Dr Dan.

## 2025-05-15 ENCOUNTER — OFFICE VISIT (OUTPATIENT)
Age: 25
End: 2025-05-15
Payer: COMMERCIAL

## 2025-05-15 VITALS
DIASTOLIC BLOOD PRESSURE: 68 MMHG | SYSTOLIC BLOOD PRESSURE: 112 MMHG | WEIGHT: 180.38 LBS | HEIGHT: 62 IN | BODY MASS INDEX: 33.19 KG/M2

## 2025-05-15 DIAGNOSIS — Z01.419 GYNECOLOGIC EXAM NORMAL: ICD-10-CM

## 2025-05-15 DIAGNOSIS — Z11.3 SCREENING EXAMINATION FOR VENEREAL DISEASE: ICD-10-CM

## 2025-05-15 DIAGNOSIS — Z12.4 PAP SMEAR FOR CERVICAL CANCER SCREENING: ICD-10-CM

## 2025-05-15 DIAGNOSIS — N91.2 AMENORRHEA: Primary | ICD-10-CM

## 2025-05-15 PROCEDURE — 99395 PREV VISIT EST AGE 18-39: CPT | Performed by: OBSTETRICS & GYNECOLOGY

## 2025-05-15 ASSESSMENT — ENCOUNTER SYMPTOMS
RESPIRATORY NEGATIVE: 1
GASTROINTESTINAL NEGATIVE: 1

## 2025-05-15 NOTE — PROGRESS NOTES
Chief Complaint   Patient presents with    Annual Exam     LMP-? In office upt=negative today     /68 (BP Site: Left Upper Arm, Patient Position: Sitting, BP Cuff Size: Large Adult)   Ht 1.575 m (5' 2\")   Wt 81.8 kg (180 lb 6 oz)   LMP  (LMP Unknown)   BMI 32.99 kg/m²

## 2025-05-15 NOTE — PROGRESS NOTES
Lisa John is a , 24 y.o. female   No LMP recorded (lmp unknown).    She presents for her annual    She is having no significant problems.      Menstrual status:  Cycles are often irregular with no apparent pattern.    Flow: absent.      She does not have dysmenorrhea.      Medical conditions:  Since her last annual GYN exam about one year ago, she has not the following changes in her health history: none.     Mammogram History:    AROLDO Results (most recent):  @[employer](NRZ6105:1)@     DEXA Results (most recent):  @[employer](TZG8153:1)@       Past Medical History:   Diagnosis Date    Gestational thrombocytopenia 2024    Sickle cell trait in mother affecting pregnancy      Past Surgical History:   Procedure Laterality Date    TONSILLECTOMY Bilateral     age 6    WISDOM TOOTH EXTRACTION Bilateral     age 16       Prior to Admission medications    Not on File       No Known Allergies       Tobacco History:  reports that she has never smoked. She has never used smokeless tobacco.  Alcohol use:  reports no history of alcohol use.  Drug use:  reports no history of drug use.    Family Medical/Cancer History:   Family History   Family history unknown: Yes        Review of Systems   Constitutional: Negative.    Respiratory: Negative.     Cardiovascular: Negative.    Gastrointestinal: Negative.    Genitourinary: Negative.    Musculoskeletal: Negative.    Neurological: Negative.    Psychiatric/Behavioral: Negative.           /68 (BP Site: Left Upper Arm, Patient Position: Sitting, BP Cuff Size: Large Adult)   Ht 1.575 m (5' 2\")   Wt 81.8 kg (180 lb 6 oz)   LMP  (LMP Unknown)   BMI 32.99 kg/m²     Physical Exam  Constitutional:       Appearance: Normal appearance.   Cardiovascular:      Rate and Rhythm: Normal rate and regular rhythm.   Pulmonary:      Effort: Pulmonary effort is normal.      Breath sounds: Normal breath sounds.   Chest:   Breasts:     Right: Normal.      Left: Normal.

## 2025-05-17 LAB
A VAGINAE DNA VAG QL NAA+PROBE: ABNORMAL SCORE
BVAB2 DNA VAG QL NAA+PROBE: ABNORMAL SCORE
C ALBICANS DNA VAG QL NAA+PROBE: NEGATIVE
C GLABRATA DNA VAG QL NAA+PROBE: NEGATIVE
C TRACH DNA SPEC QL NAA+PROBE: NEGATIVE
M GENITALIUM DNA SPEC QL NAA+PROBE: NEGATIVE
M HOMINIS DNA SPEC QL NAA+PROBE: POSITIVE
MEGA1 DNA VAG QL NAA+PROBE: ABNORMAL SCORE
N GONORRHOEA DNA VAG QL NAA+PROBE: NEGATIVE
T VAGINALIS DNA VAG QL NAA+PROBE: NEGATIVE
UREAPLASMA DNA SPEC QL NAA+PROBE: POSITIVE

## 2025-05-19 ENCOUNTER — RESULTS FOLLOW-UP (OUTPATIENT)
Age: 25
End: 2025-05-19

## 2025-05-19 ENCOUNTER — TELEPHONE (OUTPATIENT)
Age: 25
End: 2025-05-19

## 2025-05-19 RX ORDER — FLUCONAZOLE 150 MG/1
150 TABLET ORAL ONCE
Qty: 1 TABLET | Refills: 0 | Status: SHIPPED | OUTPATIENT
Start: 2025-05-19 | End: 2025-05-19

## 2025-05-19 RX ORDER — CLINDAMYCIN HYDROCHLORIDE 300 MG/1
300 CAPSULE ORAL 2 TIMES DAILY
Qty: 14 CAPSULE | Refills: 0 | Status: SHIPPED | OUTPATIENT
Start: 2025-05-19 | End: 2025-05-26

## 2025-05-19 RX ORDER — AZITHROMYCIN 500 MG/1
500 TABLET, FILM COATED ORAL DAILY
Qty: 7 TABLET | Refills: 0 | Status: SHIPPED | OUTPATIENT
Start: 2025-05-19 | End: 2025-05-26

## 2025-05-19 NOTE — TELEPHONE ENCOUNTER
Patient called stating she saw she had some abnormal results from her Nuswab on 05/15/25.  Advised her Dr Dan has not had a chance to review her results yet as they came back over the weekend.  She was made aware she will be contacted once he has reviewed her results.

## 2025-05-20 LAB
., LABCORP: NORMAL
CYTOLOGIST CVX/VAG CYTO: NORMAL
CYTOLOGY CVX/VAG DOC CYTO: NORMAL
CYTOLOGY CVX/VAG DOC THIN PREP: NORMAL
DX ICD CODE: NORMAL
OTHER STN SPEC: NORMAL
SERVICE CMNT-IMP: NORMAL
STAT OF ADQ CVX/VAG CYTO-IMP: NORMAL

## 2025-06-16 ENCOUNTER — TELEPHONE (OUTPATIENT)
Age: 25
End: 2025-06-16

## 2025-06-16 NOTE — TELEPHONE ENCOUNTER
Patient contacted the office reports she is interested in starting something for her cycle she is undecided but possibly leaning toward an IUD  Advised her she can stop by at any time to fill out the paperwork for IUD.  She reports will wait until provider returns to see if that is a good option for her.